# Patient Record
Sex: MALE | Race: WHITE | Employment: FULL TIME | URBAN - METROPOLITAN AREA
[De-identification: names, ages, dates, MRNs, and addresses within clinical notes are randomized per-mention and may not be internally consistent; named-entity substitution may affect disease eponyms.]

---

## 2019-03-06 ENCOUNTER — HOSPITAL ENCOUNTER (EMERGENCY)
Age: 44
Discharge: HOME | End: 2019-03-07

## 2019-03-06 DIAGNOSIS — R04.0 EPISTAXIS: Primary | ICD-10-CM

## 2019-03-06 LAB
ANION GAP SERPL CALCULATED.3IONS-SCNC: 15 MMOL/L (ref 3–16)
BASOPHILS ABSOLUTE: 0 K/UL (ref 0–0.2)
BASOPHILS RELATIVE PERCENT: 0.6 %
BUN BLDV-MCNC: 21 MG/DL (ref 7–20)
CALCIUM SERPL-MCNC: 9.3 MG/DL (ref 8.3–10.6)
CHLORIDE BLD-SCNC: 100 MMOL/L (ref 99–110)
CO2: 21 MMOL/L (ref 21–32)
CREAT SERPL-MCNC: 1 MG/DL (ref 0.9–1.3)
EOSINOPHILS ABSOLUTE: 0 K/UL (ref 0–0.6)
EOSINOPHILS RELATIVE PERCENT: 0.5 %
GFR AFRICAN AMERICAN: >60
GFR NON-AFRICAN AMERICAN: >60
GLUCOSE BLD-MCNC: 109 MG/DL (ref 70–99)
HCT VFR BLD CALC: 37.2 % (ref 40.5–52.5)
HEMOGLOBIN: 12.4 G/DL (ref 13.5–17.5)
LYMPHOCYTES ABSOLUTE: 1.3 K/UL (ref 1–5.1)
LYMPHOCYTES RELATIVE PERCENT: 15.6 %
MCH RBC QN AUTO: 30.6 PG (ref 26–34)
MCHC RBC AUTO-ENTMCNC: 33.4 G/DL (ref 31–36)
MCV RBC AUTO: 91.7 FL (ref 80–100)
MONOCYTES ABSOLUTE: 0.6 K/UL (ref 0–1.3)
MONOCYTES RELATIVE PERCENT: 7.5 %
NEUTROPHILS ABSOLUTE: 6.3 K/UL (ref 1.7–7.7)
NEUTROPHILS RELATIVE PERCENT: 75.8 %
PDW BLD-RTO: 13.8 % (ref 12.4–15.4)
PLATELET # BLD: 228 K/UL (ref 135–450)
PMV BLD AUTO: 7.9 FL (ref 5–10.5)
POTASSIUM SERPL-SCNC: 4 MMOL/L (ref 3.5–5.1)
RBC # BLD: 4.05 M/UL (ref 4.2–5.9)
SODIUM BLD-SCNC: 136 MMOL/L (ref 136–145)
WBC # BLD: 8.3 K/UL (ref 4–11)

## 2019-03-06 PROCEDURE — 80048 BASIC METABOLIC PNL TOTAL CA: CPT

## 2019-03-06 PROCEDURE — 99283 EMERGENCY DEPT VISIT LOW MDM: CPT

## 2019-03-06 PROCEDURE — 85025 COMPLETE CBC W/AUTO DIFF WBC: CPT

## 2019-03-06 ASSESSMENT — PAIN SCALES - GENERAL: PAINLEVEL_OUTOF10: 0

## 2019-03-07 VITALS
DIASTOLIC BLOOD PRESSURE: 79 MMHG | SYSTOLIC BLOOD PRESSURE: 123 MMHG | TEMPERATURE: 98.7 F | BODY MASS INDEX: 32.92 KG/M2 | HEART RATE: 122 BPM | WEIGHT: 264.77 LBS | OXYGEN SATURATION: 99 % | RESPIRATION RATE: 18 BRPM | HEIGHT: 75 IN

## 2019-03-07 PROCEDURE — 6370000000 HC RX 637 (ALT 250 FOR IP): Performed by: PHYSICIAN ASSISTANT

## 2019-03-07 RX ADMIN — COCAINE HYDROCHLORIDE: 40 SOLUTION TOPICAL at 01:05

## 2019-03-16 ASSESSMENT — ENCOUNTER SYMPTOMS
EYE REDNESS: 0
SHORTNESS OF BREATH: 0
NAUSEA: 0
APNEA: 0
BACK PAIN: 0
CHOKING: 0
VOMITING: 0
ABDOMINAL PAIN: 0
FACIAL SWELLING: 0
EYE DISCHARGE: 0

## 2020-09-29 ENCOUNTER — APPOINTMENT (OUTPATIENT)
Dept: GENERAL RADIOLOGY | Age: 45
End: 2020-09-29

## 2020-09-29 ENCOUNTER — HOSPITAL ENCOUNTER (EMERGENCY)
Age: 45
Discharge: HOME | End: 2020-09-29
Attending: STUDENT IN AN ORGANIZED HEALTH CARE EDUCATION/TRAINING PROGRAM

## 2020-09-29 VITALS
RESPIRATION RATE: 17 BRPM | HEART RATE: 78 BPM | BODY MASS INDEX: 32.23 KG/M2 | OXYGEN SATURATION: 99 % | SYSTOLIC BLOOD PRESSURE: 124 MMHG | DIASTOLIC BLOOD PRESSURE: 85 MMHG | HEIGHT: 76 IN | TEMPERATURE: 99.1 F

## 2020-09-29 LAB
ANION GAP SERPL CALCULATED.3IONS-SCNC: 10 MMOL/L (ref 3–16)
BASOPHILS ABSOLUTE: 0 K/UL (ref 0–0.2)
BASOPHILS RELATIVE PERCENT: 0.4 %
BUN BLDV-MCNC: 18 MG/DL (ref 7–20)
CALCIUM SERPL-MCNC: 9.7 MG/DL (ref 8.3–10.6)
CHLORIDE BLD-SCNC: 103 MMOL/L (ref 99–110)
CO2: 25 MMOL/L (ref 21–32)
CREAT SERPL-MCNC: 1.1 MG/DL (ref 0.9–1.3)
EOSINOPHILS ABSOLUTE: 0.1 K/UL (ref 0–0.6)
EOSINOPHILS RELATIVE PERCENT: 1.5 %
GFR AFRICAN AMERICAN: >60
GFR NON-AFRICAN AMERICAN: >60
GLUCOSE BLD-MCNC: 102 MG/DL (ref 70–99)
HCT VFR BLD CALC: 40.3 % (ref 40.5–52.5)
HEMOGLOBIN: 13.6 G/DL (ref 13.5–17.5)
LYMPHOCYTES ABSOLUTE: 2.2 K/UL (ref 1–5.1)
LYMPHOCYTES RELATIVE PERCENT: 28.1 %
MCH RBC QN AUTO: 30.2 PG (ref 26–34)
MCHC RBC AUTO-ENTMCNC: 33.7 G/DL (ref 31–36)
MCV RBC AUTO: 89.6 FL (ref 80–100)
MONOCYTES ABSOLUTE: 0.6 K/UL (ref 0–1.3)
MONOCYTES RELATIVE PERCENT: 7.7 %
NEUTROPHILS ABSOLUTE: 5 K/UL (ref 1.7–7.7)
NEUTROPHILS RELATIVE PERCENT: 62.3 %
PDW BLD-RTO: 13.5 % (ref 12.4–15.4)
PLATELET # BLD: 196 K/UL (ref 135–450)
PMV BLD AUTO: 8.7 FL (ref 5–10.5)
POTASSIUM REFLEX MAGNESIUM: 3.8 MMOL/L (ref 3.5–5.1)
RBC # BLD: 4.5 M/UL (ref 4.2–5.9)
SODIUM BLD-SCNC: 138 MMOL/L (ref 136–145)
TROPONIN: <0.01 NG/ML
TROPONIN: <0.01 NG/ML
WBC # BLD: 8 K/UL (ref 4–11)

## 2020-09-29 PROCEDURE — 71046 X-RAY EXAM CHEST 2 VIEWS: CPT

## 2020-09-29 PROCEDURE — 99285 EMERGENCY DEPT VISIT HI MDM: CPT

## 2020-09-29 PROCEDURE — 80048 BASIC METABOLIC PNL TOTAL CA: CPT

## 2020-09-29 PROCEDURE — 85025 COMPLETE CBC W/AUTO DIFF WBC: CPT

## 2020-09-29 PROCEDURE — 93005 ELECTROCARDIOGRAM TRACING: CPT | Performed by: STUDENT IN AN ORGANIZED HEALTH CARE EDUCATION/TRAINING PROGRAM

## 2020-09-29 PROCEDURE — 84484 ASSAY OF TROPONIN QUANT: CPT

## 2020-09-29 RX ORDER — FAMOTIDINE 20 MG/1
20 TABLET, FILM COATED ORAL 2 TIMES DAILY PRN
Qty: 60 TABLET | Refills: 0 | Status: SHIPPED | OUTPATIENT
Start: 2020-09-29 | End: 2021-09-13 | Stop reason: CLARIF

## 2020-09-29 ASSESSMENT — PAIN DESCRIPTION - LOCATION: LOCATION: CHEST

## 2020-09-29 ASSESSMENT — PAIN DESCRIPTION - ONSET: ONSET: ON-GOING

## 2020-09-29 ASSESSMENT — PAIN DESCRIPTION - DESCRIPTORS: DESCRIPTORS: STABBING

## 2020-09-29 ASSESSMENT — PAIN DESCRIPTION - FREQUENCY: FREQUENCY: INTERMITTENT

## 2020-09-29 ASSESSMENT — PAIN SCALES - GENERAL: PAINLEVEL_OUTOF10: 3

## 2020-09-29 ASSESSMENT — PAIN DESCRIPTION - ORIENTATION: ORIENTATION: LEFT

## 2020-09-29 ASSESSMENT — PAIN DESCRIPTION - PAIN TYPE: TYPE: ACUTE PAIN

## 2020-09-29 ASSESSMENT — PAIN DESCRIPTION - PROGRESSION: CLINICAL_PROGRESSION: NOT CHANGED

## 2020-09-29 NOTE — ED TRIAGE NOTES
Pt arrives to the ER via private vehicle with complaints of chest pain. Pt states it started years ago, but last Thursday it got a lot worse and has been bad since then. Pt states pain is 3/10. Pt states no cardiac hx. NAD noted, respirations even and easy, skin warm and dry.

## 2020-09-30 LAB
EKG ATRIAL RATE: 72 BPM
EKG ATRIAL RATE: 86 BPM
EKG DIAGNOSIS: NORMAL
EKG DIAGNOSIS: NORMAL
EKG P AXIS: 27 DEGREES
EKG P AXIS: 51 DEGREES
EKG P-R INTERVAL: 158 MS
EKG P-R INTERVAL: 170 MS
EKG Q-T INTERVAL: 358 MS
EKG Q-T INTERVAL: 378 MS
EKG QRS DURATION: 102 MS
EKG QRS DURATION: 112 MS
EKG QTC CALCULATION (BAZETT): 413 MS
EKG QTC CALCULATION (BAZETT): 428 MS
EKG R AXIS: 27 DEGREES
EKG R AXIS: 46 DEGREES
EKG T AXIS: 34 DEGREES
EKG T AXIS: 51 DEGREES
EKG VENTRICULAR RATE: 72 BPM
EKG VENTRICULAR RATE: 86 BPM

## 2020-09-30 PROCEDURE — 93010 ELECTROCARDIOGRAM REPORT: CPT | Performed by: INTERNAL MEDICINE

## 2020-09-30 NOTE — ED PROVIDER NOTES
[address] [facility]      Pt Name: Rafael Zavala  MRN: [id number]  [place] 1975  Date of evaluation: 9/29/2020  Provider: Emmanuel Hsieh MD    CHIEF COMPLAINT       Chief Complaint   Patient presents with    Chest Pain     since thursday         HISTORY OF PRESENT ILLNESS   (Location/Symptom, Timing/Onset,Context/Setting, Quality, Duration, Modifying Factors, Severity)  Note limiting factors. Rafael Zavala is a 39 y.o. male who presents to the emergency department c/o chest pain x 4 days. Described as intermittent, waxing and waning in nature, described as burning and tightness localized to the central left chest, non radiating, worse in the morning and when recumbent, lasts 10-30 seconds at the shortest duration and 1-2 hours at the longest, typically alleviated by eating breakfast, associated with \"feeling every heart beat\" not associated with shortness of breath, nausea, vomiting, diaphoresis. Episode 4 days ago not like episode today, stronger, lasted for 1 hour prior to completely resolving. Pt does manual labor for a living and pain never brought on or associated with exertion. Symptoms not otherwise alleviated or exacerbated by other factors. NursingNotes were reviewed. REVIEW OF SYSTEMS    (2-9 systems for level 4, 10 or more for level 5)       Constitutional: No fever or chills. Eye: No visual disturbances. No eye pain. Ear/Nose/Mouth/Throat: No nasal congestion. No sore throat. Respiratory: No cough, No shortness of breath, No sputum production. Cardiovascular: + chest pain. No palpitations. Gastrointestinal: No abdominal pain. No nausea or vomiting  Genitourinary: No dysuria. No hematuria. Hematology/Lymphatics: No bleeding or bruising tendency. Immunologic: No malaise. No swollen glands. Musculoskeletal: No back pain. No joint pain. Integumentary: No rash. No abrasions. Neurologic: No headache.  No focal numbness or weakness. PAST MEDICAL HISTORY   History reviewed. No pertinent past medical history. SURGICALHISTORY     History reviewed. No pertinent surgical history. CURRENT MEDICATIONS       Discharge Medication List as of 9/29/2020  9:11 PM          ALLERGIES     Patient has no known allergies. FAMILY HISTORY     History reviewed. No pertinent family history. SOCIAL HISTORY       Social History     Socioeconomic History    Marital status: Legally      Spouse name: None    Number of children: None    Years of education: None    Highest education level: None   Occupational History    None   Social Needs    Financial resource strain: None    Food insecurity     Worry: None     Inability: None    Transportation needs     Medical: None     Non-medical: None   Tobacco Use    Smoking status: Never Smoker    Smokeless tobacco: Never Used   Substance and Sexual Activity    Alcohol use:  Yes     Alcohol/week: 6.0 standard drinks     Types: 6 Cans of beer per week     Comment: socially    Drug use: Never    Sexual activity: None   Lifestyle    Physical activity     Days per week: None     Minutes per session: None    Stress: None   Relationships    Social connections     Talks on phone: None     Gets together: None     Attends Cheondoism service: None     Active member of club or organization: None     Attends meetings of clubs or organizations: None     Relationship status: None    Intimate partner violence     Fear of current or ex partner: None     Emotionally abused: None     Physically abused: None     Forced sexual activity: None   Other Topics Concern    None   Social History Narrative    None       SCREENINGS             PHYSICAL EXAM    (up to 7 for level 4, 8 or more for level 5)     ED Triage Vitals [09/29/20 1628]   BP Temp Temp Source Pulse Resp SpO2 Height Weight   (!) 154/98 99.1 °F (37.3 °C) Oral 89 20 99 % 6' 4\" (1.93 m) --       General: Alert and oriented appropriately for age, No acute distress. Eye: Normal conjunctiva. Pupils equal and reactive. HENT: Oral mucosa is moist.  Respiratory: Respirations even and non-labored. Lungs CTAB. Cardiovascular: Normal rate, Regular rhythm. Intact pulses throughout peripherally. Gastrointestinal: Soft, Non-tender, Non-distended. Musculoskeletal: No swelling. Integumentary: Warm, Dry. Neurologic: Alert and appropriate for age. No focal deficits. Psychiatric: Cooperative. DIAGNOSTIC RESULTS     EKG: All EKG's are interpreted by the Emergency Department Physician who either signs or Co-signsthis chart in the absence of a cardiologist.    The Ekg from [id number] interpreted by me shows  normal sinus rhythm with a rate of 86 bpm, incomplete RBBB  Axis is   Normal  QTc is  within an acceptable range  Intervals and Durations are unremarkable. ST Segments: normal  No significant change from prior EKG dated 8/11/2015. Repeat EKG at 1915. The Ekg interpreted by me shows  normal sinus rhythm with a rate of 72 bpm, incomplete RBBB. Axis is   Normal  QTc is  normal  Intervals and Durations are unremarkable. ST Segments: no acute change  No significant change from immediate prior EKG.           RADIOLOGY:   Non-plain filmimages such as CT, Ultrasound and MRI are read by the radiologist. Plain radiographic images are visualized and preliminarily interpreted by the emergency physician with the below findings:      Interpretation per the Radiologist below, if available at the time ofthis note:    XR CHEST (2 VW)   Final Result   No active cardiopulmonary disease               ED BEDSIDE ULTRASOUND:   Performed by ED Physician - none    LABS:  Labs Reviewed   CBC WITH AUTO DIFFERENTIAL - Abnormal; Notable for the following components:       Result Value    Hematocrit 40.3 (*)     All other components within normal limits    Narrative:     Performed at:  [facility]  [address] [facility] [place] [address]   Phone [phone]   BASIC METABOLIC PANEL W/ REFLEX TO MG FOR LOW K - Abnormal; Notable for the following components:    Glucose 102 (*)     All other components within normal limits    Narrative:     Performed at:  [facility]  [address] [place] [address]   Phone [phone]   TROPONIN    Narrative:     Performed at:  [facility]  [address] [place] [address]   Phone [phone]   TROPONIN    Narrative:     Performed at:  [facility]  [address] [place] [address]   Phone [phone]       All other labs were within normal range or not returned as of this dictation. EMERGENCY DEPARTMENT COURSE and DIFFERENTIAL DIAGNOSIS/MDM:   Vitals:    Vitals:    20 1628 20 1850 20 1901 20 2140   BP: (!) 154/98 133/84 137/86 124/85   Pulse: 89 72 78 78   Resp:    Temp: 99.1 °F (37.3 °C)      TempSrc: Oral      SpO2: 99% 98% 99% 99%   Height: 6' 4\" (1.93 m)            Medical decision makin yo M with chest pain that he has felt intermittently for years but describes worse over the past 4 days, described as likely GERD given worse after lying recumbent and alleviated by food, not exertional and has not bothered pt with manual labor. Pt concerned about potential cardiac etiology and is reasonable, states without insurance, recommended use [facility]) services to obtain prompt outpt follow up as pt without active chest pain, only moderately suspicious story for anginal type pain and risk factor of obesity, no EKG changes from 2015 EKG and no interval change in the ED, and no troponin detectability x 2.       Heart Score for chest pain patients:  History:  [] Slightly suspicious 0  [x] Moderately suspicious +1  [] Highly suspicious +2    · Middle or left-sided  · Heaviness  · Initiated by exertion, emotion, or cold  · Radiation  · Relief of symptoms with Nitroglycerin      0 = 0 of the risk factors  +1 = 1 or 2 of the risk factors  +2 = 3 or more of risk factors    EKG:  [x] Normal 0  [] Non-specific disturbance +1  [] Significant ST deviation +2  Age:  [x] <45 = 0  [] 45-65 = +1  [] >65 = +2  Risk factors:  DM, Hyperlipidemia, HTN, Obesity, CAD, HIV, Smoking, Cocaine, Family History,   [] No risk factors 0  [x] 1 or 2 risk factors +1  [] 3+ risk factors +2  Troponin:  [x] Normal 0  [] 1-3X normal +1  [] >3X normal +2  HEART SCORE TOTAL = 2  Scores 0-3: 0.9-1.7% risk of adverse cardiac event. Scores 4-6: 12-16.6% risk of adverse cardiac event. Scores ? 7: 50-65% risk of adverse cardiac event. A MACE (Major Adverse Cardiac Event) was defined as all-cause mortality, myocardial infarction, or coronary revascularization. HEART Score ? 3 and 2 negative troponins:    I have discussed with the patient my clinical impression and the result of the HEART Score to screen for MACE, as well as the risks of further testing and hospitalization. The HEART Score shows that the risk for MACE is less than 1%. Although the risk of MACE has not been completely eliminated, the risks of further testing or hospitalization for MACE likely exceed any potential benefit, and the patient agrees with not pursuing further emergent evaluation or hospitalization for MACE at this time. Given d/c instructions and return precautions, pt voices understanding. Instructed pt to return to the ED for inpatient workup if could not obtain outpatient workup in reasonable time frame. D/c home, ambulated steadily from the ED. FINAL IMPRESSION      1.  Acute chest pain          DISPOSITION/PLAN   DISPOSITION Decision To Discharge 09/29/2020 08:48:15 PM      PAT[facility] Pre[facility]  [phone]          DISCHARGE MEDICATIONS:  Discharge Medication List as of 9/29/2020  9:11 PM      START taking these medications    Details famotidine (PEPCID) 20 MG tablet Take 1 tablet by mouth 2 times daily as needed (reflux), Disp-60 tablet,R-0Print                (Please note that portions of this note were completed with a voice recognition program.Efforts were made to edit the dictations but occasionally words are mis-transcribed.)    Rayne Church MD (electronically signed)  Attending Emergency Physician          Rayne Church MD  09/30/20 8081

## 2021-08-12 ENCOUNTER — OFFICE VISIT (OUTPATIENT)
Dept: SURGERY | Age: 46
End: 2021-08-12
Payer: COMMERCIAL

## 2021-08-12 VITALS — WEIGHT: 256 LBS | DIASTOLIC BLOOD PRESSURE: 88 MMHG | BODY MASS INDEX: 31.16 KG/M2 | SYSTOLIC BLOOD PRESSURE: 138 MMHG

## 2021-08-12 DIAGNOSIS — K64.8 INTERNAL HEMORRHOID, BLEEDING: Primary | ICD-10-CM

## 2021-08-12 PROCEDURE — 99202 OFFICE O/P NEW SF 15 MIN: CPT | Performed by: SURGERY

## 2021-08-12 ASSESSMENT — ENCOUNTER SYMPTOMS: ANAL BLEEDING: 1

## 2021-08-12 NOTE — PATIENT INSTRUCTIONS
CONSERVATIVE HEMORRHOID MANAGEMENT      Hemorrhoids can result from issues with chronic constipation or diarrhea, straining during bowel movements, sitting for long periods of time on the toilet, as well as obesity and pregnancy. Promoting bowel regularity and ease of passage of a bowel movement will decrease issues with hemorrhoids. One of the best ways to promote bowel regularity is by consuming a higher fiber diet. Current recommendations are that adults should try and consume 30 grams of fiber daily. Dietary fibers are either soluble or insoluble and work in slightly different ways, though are both equally important. Do not try and consume 30 grams of fiber all at once. Slowly increase your intake over a month. As you increase your fiber intake also increase the amount of water you drink. You may find it difficult to eat 30 grams of fiber daily. Fiber supplements such as Metamucil, Konsyl, or Citrucel are good sources of fiber that can be easily consumed when mixed with water. Soluble fiber is soluble in water and works by attracting water (like a sponge) to form a gel, which will slow down digestion and the movement of food through the gastrointestinal tract. Slowing digestion will make you feel full. Fiber supplements such as Metamucil, Konsyl, and Citrucel are good sources of soluble fiber. Some of the benefits of soluble fiber include weight control, lowering LDL (bad) cholesterol, improving blood sugar control, improving diarrhea, and promoting bowel regularity. Soluble fibers include psyllium, oranges, apples, pears, blueberries, strawberries, oatmeal, oat bran, oat cereal, lentils, beans, nuts, flaxseed, cucumbers, celery, and carrots. Insoluble fibers do not dissolve in water and work by adding bulk. They pass through the gastrointestinal tract relatively undigested and promote a laxative type effect, which decreases constipation.   Most of the vegetables and whole grains we eat contain insoluble fiber. The major benefit of insoluble fiber is decreasing constipation. Insoluble fibers include whole grains, whole wheat, wheat bran, bran, brown rice, nuts, barley, seeds, couscous, leafy vegetables, cabbage, broccoli, celery, carrots, green beans, cucumbers, grapes, and raisins. When starting to use a fiber supplement, such as Metamucil for example, do so slowly over a month. Start with 1 tablespoon a day for a week, increase to 2 tablespoons a day on week 2. If it is inconvenient to take a fiber supplement throughout the day is it acceptable to take it all before bed.     Please call [phone] with any questions/concerns

## 2021-08-12 NOTE — PROGRESS NOTES
Subjective:      Patient ID: Rut Guidry is a 42 y.o. male. HPI  Chief Complaint: bleeding  Patient referred by self for evaluation of rectal bleeding. Patient reports symptoms of blood that can fill the toilet. Denies pain, itching. Symptoms were first noted one week ago. Alleviated by anusol suppositories over last 3 days. Symptoms aggravated by recent long road trip. Was constipated and bowel habits worsened at that time. usually pretty regular. Denies CP, dyspnea, dizziness. Previous evaluation includes none. History hemorrhoids in past but nothing this severe. Dad with colon cancer in his 62s, maternal grandfather similarly. Patient has no significant medical or surgical history. Will plan following treatment: fiber supplementation, referral to GI for banding if no improvement. No past medical history on file. No past surgical history on file. Current Outpatient Medications   Medication Sig Dispense Refill    famotidine (PEPCID) 20 MG tablet Take 1 tablet by mouth 2 times daily as needed (reflux) (Patient not taking: Reported on 8/12/2021) 60 tablet 0     No current facility-administered medications for this visit. Prior to Admission medications    Medication Sig Start Date End Date Taking? Authorizing Provider   famotidine (PEPCID) 20 MG tablet Take 1 tablet by mouth 2 times daily as needed (reflux)  Patient not taking: Reported on 8/12/2021 9/29/20   Sirena Luna MD         No Known Allergies    Social History     Socioeconomic History    Marital status: Legally      Spouse name: Not on file    Number of children: Not on file    Years of education: Not on file    Highest education level: Not on file   Occupational History    Not on file   Tobacco Use    Smoking status: Never Smoker    Smokeless tobacco: Never Used   Substance and Sexual Activity    Alcohol use:  Yes     Alcohol/week: 6.0 standard drinks     Types: 6 Cans of beer per week     Comment: socially    Drug use: Never    Sexual activity: Not on file   Other Topics Concern    Not on file   Social History Narrative    Not on file     Social Determinants of Health     Financial Resource Strain:     Difficulty of Paying Living Expenses:    Food Insecurity:     Worried About Running Out of Food in the Last Year:     [address] in the Last Year:    Transportation Needs:     Lack of Transportation (Medical):  Lack of Transportation (Non-Medical):    Physical Activity:     Days of Exercise per Week:     Minutes of Exercise per Session:    Stress:     Feeling of Stress :    Social Connections:     Frequency of Communication with Friends and Family:     Frequency of Social Gatherings with Friends and Family:     Attends Hoahaoism Services:     Active Member of Clubs or Organizations:     Attends Club or Organization Meetings:     Marital Status:    Intimate Partner Violence:     Fear of Current or Ex-Partner:     Emotionally Abused:     Physically Abused:     Sexually Abused:        No family history on file. Review of Systems   Constitutional: Negative. Gastrointestinal: Positive for anal bleeding. Hematological: Negative. All other systems reviewed and are negative. Objective:   Physical Exam  Vitals reviewed. Constitutional:       General: He is not in acute distress. Appearance: He is well-developed. He is not diaphoretic. HENT:      Head: Normocephalic and atraumatic. Right Ear: External ear normal.      Left Ear: External ear normal.   Eyes:      Conjunctiva/sclera: Conjunctivae normal.      Pupils: Pupils are equal, round, and reactive to light. Neck:      Trachea: Trachea normal.   Cardiovascular:      Heart sounds: S1 normal and S2 normal.   Pulmonary:      Effort: Pulmonary effort is normal. No respiratory distress. Abdominal:      General: There is no distension. Palpations: Abdomen is soft.    Genitourinary:     Comments: Small friable internal hemorrhoids left lateral column  Musculoskeletal:         General: Normal range of motion. Cervical back: Normal range of motion and neck supple. Skin:     General: Skin is warm and dry. Findings: No erythema. Neurological:      Mental Status: He is alert and oriented to person, place, and time. Psychiatric:         Behavior: Behavior normal. Behavior is cooperative. Thought Content: Thought content normal.         Judgment: Judgment normal.         Assessment:       Diagnosis Orders   1. Internal hemorrhoid, bleeding  [facility] - Navin Henry DO, Gastroenterology, [facility]           Plan:      Recommend fiber supplements daily to BID to promote bowel regularity.   Increase daily water intake as well  Instructions provided  If no improvement over next 7-10 days then plan referral to GI for banding        Angelica Cee MD

## 2021-10-12 ENCOUNTER — OFFICE VISIT (OUTPATIENT)
Dept: ORTHOPEDIC SURGERY | Age: 46
End: 2021-10-12
Payer: COMMERCIAL

## 2021-10-12 VITALS — HEIGHT: 76 IN | BODY MASS INDEX: 31.05 KG/M2 | WEIGHT: 255 LBS

## 2021-10-12 DIAGNOSIS — M67.911 ROTATOR CUFF DYSFUNCTION, RIGHT: ICD-10-CM

## 2021-10-12 DIAGNOSIS — S46.911A STRAIN OF RIGHT SHOULDER, INITIAL ENCOUNTER: Primary | ICD-10-CM

## 2021-10-12 PROCEDURE — 99203 OFFICE O/P NEW LOW 30 MIN: CPT | Performed by: ORTHOPAEDIC SURGERY

## 2021-10-12 RX ORDER — MELOXICAM 15 MG/1
15 TABLET ORAL DAILY
Qty: 30 TABLET | Refills: 3 | Status: SHIPPED | OUTPATIENT
Start: 2021-10-12 | End: 2022-01-07 | Stop reason: CLARIF

## 2021-10-12 RX ORDER — TRAMADOL HYDROCHLORIDE 50 MG/1
50 TABLET ORAL NIGHTLY PRN
Qty: 10 TABLET | Refills: 0 | Status: SHIPPED | OUTPATIENT
Start: 2021-10-12 | End: 2021-10-19

## 2021-10-12 NOTE — PROGRESS NOTES
12 [facility]  History and Physical  Shoulder Pain    Date:  10/12/2021    Name:  Geri Larry  Address:  [address] [place] [address] [place] [address]    :  1975      Age:   42 y.o.    SSN:  [id number]      Medical Record Number:  <[id number]>    Reason for Visit:    Shoulder Pain (NP WC RIGHT SHOULDER)      HPI:   Geri Larry is a 42 y.o. male who presents to our office today complaining of  right shoulder pain. This patient unfortunately had a work-related injury. 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 He reports that these are somewhat heavy trophies made out a marble. He reports on 2020 he was lifting several trophies and felt a sharp pain in his right shoulder. Since then he has continued to have persistent pain and has noted weakness with overhead lifting. Patient reports that prior to this injury he never had problems with his right shoulder. He has never had to seek medical care for the right shoulder also. He reports that it is starting to interfere with his sleep at nighttime. He reports that he has not had any treatment for this so far. He was trying to manage it on his own but his symptoms continued to stay persistent. 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 Pain Assessment  Location Modifiers: Right  Severity of Pain: 9  Quality of Pain: Aching, Dull, Sharp, Throbbing  Duration of Pain: Persistent  Frequency of Pain: Intermittent  Aggravating Factors: Other (Comment) (SLEEPING)  Limiting Behavior: Yes  Relieving Factors: Rest  Result of Injury: Yes  Work-Related Injury: Yes  Are there other pain locations you wish to document?: No    Review of Systems:  A 14 point review of systems available in the scanned medical record as documented by the patient.   The review is negative with the exception of those things mentioned in the History of Present Illness and Past Medical History. Past History:  History reviewed. No pertinent past medical history. History reviewed. No pertinent surgical history. No current outpatient medications on file prior to visit. No current facility-administered medications on file prior to visit. Social History     Socioeconomic History    Marital status: Legally      Spouse name: Not on file    Number of children: Not on file    Years of education: Not on file    Highest education level: Not on file   Occupational History    Not on file   Tobacco Use    Smoking status: Never Smoker    Smokeless tobacco: Never Used   Substance and Sexual Activity    Alcohol use: Yes     Alcohol/week: 6.0 standard drinks     Types: 6 Cans of beer per week     Comment: socially    Drug use: Never    Sexual activity: Not on file   Other Topics Concern    Not on file   Social History Narrative    Not on file     Social Determinants of Health     Financial Resource Strain: Low Risk     Difficulty of Paying Living Expenses: Not hard at all   Food Insecurity: No Food Insecurity    Worried About [address] [employer] in the Last Year: Never true    [address] [employer] in the Last Year: Never true   Transportation Needs:     Lack of Transportation (Medical):  Lack of Transportation (Non-Medical):    Physical Activity:     Days of Exercise per Week:     Minutes of Exercise per Session:    Stress:     Feeling of Stress :    Social Connections:     Frequency of Communication with Friends and Family:     Frequency of Social Gatherings with Friends and Family:     Attends Shinto Services:     Active Member of Clubs or Organizations:     Attends Club or Organization Meetings:     Marital Status:    Intimate Partner Violence:     Fear of Current or Ex-Partner:     Emotionally Abused:     Physically Abused:     Sexually Abused:      History reviewed.  No pertinent family history. Current Medications:    No current outpatient medications on file. No current facility-administered medications for this visit. Allergies:  No Known Allergies    Physical Exam:  There were no vitals filed for this visit. General: Flo Raya is a healthy and well appearing 42 y.o. male who is sitting comfortably in our office in acute distress. General Exam:   Constitutional: Patient is adequately groomed with no evidence of malnutrition  DTRs: Deep tendon reflexes are intact  Mental Status: The patient is oriented to time, place and person. The patient's mood and affect are appropriate. Lymphatic: The lymphatic examination bilaterally reveals all areas to be without enlargement or induration. Vascular: Examination reveals no swelling or calf tenderness. Peripheral pulses are palpable and 2+. Neurological: The patient has good coordination. There is no weakness or sensory deficit. Neuro: alert. Oriented X 3  Eyes: Extra-ocular muscles intact  Mouth: Oral mucosa moist. No perioral lesions  Pulm: Respirations unlabored and regular. right Shoulder Exam:  Inspection:  No gross deformities, no signs of infection. Palpation:  He has tenderness over the rotator cuff footprint. No tenderness over the bicipital groove and ac joint. Active Range of Motion: Forward elevation of 150 with pain past 90 and has a painful arc, abduction of 140, external rotation with elbow at the side 45, internal rotation to the back is T7 vs T6. He has pain on arm descent. Passive Range of Motion: Passively forward elevation can be further increased to 160. Strength:  -5/5 jobes, -5/5 external rotation with resistance, 5/5 internal rotation with resistance    Special Tests:  Positive speeds, positive Neers, +cross body adduction,  No Daljit muscle deformity.     Neurovascular: Sensation to light touch is intact, no motor deficits, palpable radial pulses 2+  Comparison left Shoulder Examination:    Inspection:   No gross deformities, no signs of infection. Palpation:  He has no tenderness over the rotator footprint. No tenderness over the bicipital groove and ac joint. Active Range of Motion: Forward elevation of 160, abduction of 160, external rotation with elbow at the side 45, internal rotation to the back is T6    Passive Range of Motion:  Similar to active  Strength:  5/5 jobes, 5/5 external rotation with resistance, 5/5 internal rotation with resistance    Special Tests:   No Daljit muscle deformity. Neurovascular: Sensation to light touch is intact, no motor deficits, palpable radial pulses 2+    Laboratory:  No visits with results within 14 Day(s) from this visit. Latest known visit with results is:   Orders Only on 09/13/2021   Component Date Value    Hemoglobin A1C 09/13/2021 5.2     eAG 09/13/2021 102.5     Sodium 09/13/2021 143     Potassium 09/13/2021 4.6     Chloride 09/13/2021 104     CO2 09/13/2021 24     Anion Gap 09/13/2021 15     Glucose 09/13/2021 89     BUN 09/13/2021 17     CREATININE 09/13/2021 1.0     GFR Non- 09/13/2021 >60     GFR  09/13/2021 >60     Calcium 09/13/2021 10.3     Cholesterol, Total 09/13/2021 171     Triglycerides 09/13/2021 122     HDL 09/13/2021 56     LDL Calculated 09/13/2021 91     VLDL Cholesterol Calcula* 09/13/2021 24       No results found for this or any previous visit (from the past 24 hour(s)). Radiographic:  3 xray views of the right  shoulder including True AP in internal and external and axillary lateral were taken in our office today reveal no fractures, dislocations, visible tumors, or signs of acute trauma. Self assessment questionnaires including ASES and Simple Shoulder Test were completed today.     Assessment:  Zack Coronel is a 42 y.o. male with a history of a work-related injury currently with right shoulder pain related to rotator cuff dysfunction    Impression:  Encounter Diagnoses   Name Primary?  Strain of right shoulder, initial encounter Yes    Rotator cuff dysfunction, right        Office Procedures:  Orders Placed This Encounter   Procedures    XR SHOULDER RIGHT (MIN 2 VIEWS)     Standing Status:   Future     Number of Occurrences:   1     Standing Expiration Date:   10/12/2022     Order Specific Question:   Reason for exam:     Answer:   pain    MRI SHOULDER RIGHT WO CONTRAST     Standing Status:   Future     Standing Expiration Date:   10/12/2022     Order Specific Question:   Reason for exam:     Answer:   MRI right shoulder PRoscan       Plan:   Patient fortunately had a work-related injury currently having pain and weakness with overhead lifting specifically. His exam does show rotator cuff weakness and biceps pathology. This has been ongoing for nearly 11 months. 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 We will need to get this approved through Workmen's Comp. first.  Once this is completed we will see him back in our office to review the results. In the meantime we will go ahead and prescribe meloxicam which was sent to his pharmacy. We also sent a small prescription for tramadol that he can take at nighttime for any discomfort. 10/12/2021  9:38 AM      Lex Doherty PA-C  Orthopaedic Sports Medicine Physician Assistant    During this examination, I, Lex Doherty PA-C, functioned as a scribe for Dr. Calista Perez. This dictation was performed with a verbal recognition program (DRAGON) and it was checked for errors. It is possible that there are still dictated errors within this office note. If so, please bring any errors to my attention for an addendum.   All efforts were made to ensure that this office note is accurate.  _________________  I, Dr. Calista Perez, personally performed the services described in this documentation as described by Deng Camilo PA-C in my presence, and it is both accurate and complete. Ike Matos MD, PhD  10/12/2021

## 2021-11-04 ENCOUNTER — TELEPHONE (OUTPATIENT)
Dept: ORTHOPEDIC SURGERY | Age: 46
End: 2021-11-04

## 2021-11-04 NOTE — TELEPHONE ENCOUNTER
Received approval for MRI    Would like to get scheduled        Please call [phone] la frederick

## 2021-11-15 ENCOUNTER — TELEPHONE (OUTPATIENT)
Dept: ORTHOPEDIC SURGERY | Age: 46
End: 2021-11-15

## 2021-11-30 ENCOUNTER — OFFICE VISIT (OUTPATIENT)
Dept: ORTHOPEDIC SURGERY | Age: 46
End: 2021-11-30
Payer: COMMERCIAL

## 2021-11-30 VITALS — HEIGHT: 76 IN | WEIGHT: 255 LBS | BODY MASS INDEX: 31.05 KG/M2

## 2021-11-30 DIAGNOSIS — M67.911 ROTATOR CUFF DYSFUNCTION, RIGHT: ICD-10-CM

## 2021-11-30 DIAGNOSIS — S46.911A STRAIN OF RIGHT SHOULDER, INITIAL ENCOUNTER: Primary | ICD-10-CM

## 2021-11-30 PROCEDURE — 99213 OFFICE O/P EST LOW 20 MIN: CPT | Performed by: ORTHOPAEDIC SURGERY

## 2021-11-30 NOTE — LETTER
[facility]  Surgery Precert & Billing Form:    DEMOGRAPHICS:                                                                                                       Patient Name:  Kashif Fisher  Patient :  1975   Patient SS#:      Patient Phone:  [phone] (home) [phone] (work) Alt. Patient Phone:    Patient Address:  [address] [place] [address]    PCP:  Myles Wesley MD  Insurance: work comp     DIAGNOSIS & PROCEDURE:                                                                                      Diagnosis:     Strain of right shoulder, initial encounter  - Primary [id number]    Rotator cuff dysfunction, right  M67.911        Operation: right    SURGERY  INFORMATION  Date of Surgery:      Location:       Type:    Outpatient  23 hour hold:  No  Surgeon:        Makenna Worley.  Christy Reardon MD         21     BILLING INFORMATION:                                                                                                Physician Procedure                                            CPT Codes                      PA, or Fellow Procedure                                      CPT Codes                      Precert information:

## 2021-11-30 NOTE — LETTER
[facility]  Surgery Precert & Billing Form:    DEMOGRAPHICS:                                                                                                       Patient Name:  Antoinette Bertrand  Patient :  1975   Patient SS#:      Patient Phone:  [phone] (home) [phone] (work) Alt. Patient Phone:    Patient Address:  [address] [place] [address]    PCP:  Yuridia Nguyễn MD  Insurance: work comp     DIAGNOSIS & PROCEDURE:                                                                                      Diagnosis:     Strain of right shoulder, initial encounter  - Primary [id number]    Rotator cuff dysfunction, right  M67.911        Operation: right    SURGERY  INFORMATION  Date of Surgery:      Location:       Type:    Outpatient  23 hour hold:  No  Surgeon:        Nicole De Souza.  Radha Waggoner MD         21     BILLING INFORMATION:                                                                                                Physician Procedure                                            CPT Codes                      PA, or Fellow Procedure                                      CPT Codes                      Precert information:

## 2021-11-30 NOTE — PROGRESS NOTES
Chief Complaint    Shoulder Pain ( RIGHT SHOULDER MRI)      History of Present Illness:  Grady Lombardo is a pleasant, 42 y.o., male, here today for follow up of his right shoulder and imaging results. To recap, this patient sustained a work-related injury on 11/18/20 which resulted in persistent pain and weakness with daily activities, especially overhead activities. This is being covered under [facility]. We did order an Mri to evaluate the rotator cuff and biceps. He tolerated the study well and presents today to review those results. He has had no real change in symptoms since previously evaluated. He reports no new injuries or setbacks. Pain Assessment  Location of Pain: Shoulder  Location Modifiers: Right  Severity of Pain: 0  Quality of Pain: Aching, Sharp  Frequency of Pain: Intermittent  Aggravating Factors: Other (Comment) (sleeping)  Limiting Behavior: Some  Relieving Factors: Rest, Nsaids  Result of Injury: Yes  Work-Related Injury: Yes  Are there other pain locations you wish to document?: No      Medical History:  Patient's medications, allergies, past medical, surgical, social and family histories were reviewed and updated as appropriate. Patient has had no medical changes since last evaluated 10/12/21          Review of Systems  A 14 point review of systems was completed by the patient on 10/12/21 and is available in the media section of the scanned medical record and was reviewed on 11/30/2021. The review is negative with the exception of those things mentioned in the HPI and Past Medical History    Vital Signs: There were no vitals filed for this visit. General/Appearance: Alert and oriented and in no apparent distress. Skin:  There are no skin lesions, cellulitis, or extreme edema. The patient has warm and well-perfused Bilateral upper extremities with brisk capillary refill. Right Shoulder Exam:  Inspection: No gross deformities, no signs of infection.     Palpation: Point of maximal tenderness is over the biceps    Active Range of Motion: Forward Elevation 150, External Rotation 45    Passive Range of Motion: Deferred    Strength:  External Rotation 5-/5, Internal Rotation 5-/5, Supraspinatus 4+/5    Special Tests:  Positive Speed's, Positive Jorge's, No Daljit muscle deformity. Neurovascular: Sensation to light touch is intact, no motor deficits, palpable radial pulses 2+      Radiology:     No new XR obtained at this time. MRI Right shoulder: 11/16/21  CONCLUSION:   1. Abnormal signal mid to distal supraspinatus and infraspinatus.  Partial-thickness    interstitial and likely articular surface tearing mid to distal supraspinatus and infraspinatus    measures at least 2.6 x 1.8cm.  This occupies at least 50% of the depth.  Bursitis present. 2. Outlet-related cuff impingement [facility] joint hypertrophy and a lateral downsloping    hypertrophied type 2 acromion.  AC arthrosis present. 3. Nondisplaced SLAP 2C tear.  This is chronic and may be subclinical.         Assessment :  Mr. Joshua Ryan is a pleasant, 42 y.o. patient with a high grade, partial-thickness tear of the rotator cuff. This is a surgical problem. Impression:  Encounter Diagnoses   Name Primary?  Strain of right shoulder, initial encounter Yes    Rotator cuff dysfunction, right        Office Procedures:  No orders of the defined types were placed in this encounter. Treatment Plan: We were able to review pertinent imaging today with the patient. We discussed diagnosis and treatment options in detail. Our recommendation in this young, active patient is surgical intervention to arthroscopically repair the rotator cuff. We will also evaluate the biceps tendon as he may require a biceps tenodesis - this will be decided intra-operatively. We discussed in detail risks, benefits and expectations postoperatively. We also discussed a recovery timeline following this operation.  We could proceed with conservative treatment to manage his pain, however this would only mask his pain rather than address the root problem. If he puts off surgery for a prolonged period of time he risks the tear progressing to full thickness. This will make his recovery less predictable. We will need to get this approved through workmen's comp. We will expedite this process as this patient is ready to move forward with surgery as soon as possible, given he just entered his off season for work and would like to maximize time of recovery. We will see Norma Ferminole back as needed. All questions were answered to patient's satisfaction and He was encouraged to call with any further questions or concerns. Areli [place] is in agreement with this plan. 11/30/2021  3:27 PM    Maria C Gilbert ATC  [employer] [address] [facility]    During this examination, I, Mat Bella, functioned as a scribe for Dr. Kizzy Pierce. The history taking and physical examination were performed by Dr. Sandra Terrell. All counseling during the appointment was performed between the patient and Dr. Sandra Terrell. 11/30/21  ______________  I, Dr. Kizzy Pierce, personally performed the services described in this documentation as described by Maria C Gilbert ATC in my presence, and it is both accurate and complete. Ike Terrell MD, PhD  11/30/2021

## 2021-12-01 ENCOUNTER — TELEPHONE (OUTPATIENT)
Dept: ORTHOPEDIC SURGERY | Age: 46
End: 2021-12-01

## 2021-12-01 NOTE — TELEPHONE ENCOUNTER
WC  called to say this patient stated surgery is scheduled for this Friday. There are no DX allowed on the claim, so if SX gets approved it will only be as Approved With Disclaimer. Also, Sx cannot be requested with DX Rotator cuff dysfunction we will need to request as a RCT.

## 2021-12-16 ENCOUNTER — TELEPHONE (OUTPATIENT)
Dept: ORTHOPEDIC SURGERY | Age: 46
End: 2021-12-16

## 2021-12-16 NOTE — TELEPHONE ENCOUNTER
SX is approved with disclaimer with DX in Allowed/Appeal status. The O  is asking if Ochoa Peres will do surgery with Disclaimer or would he prefer to wait for final  DX decision?

## 2021-12-20 ENCOUNTER — TELEPHONE (OUTPATIENT)
Dept: ORTHOPEDIC SURGERY | Age: 46
End: 2021-12-20

## 2021-12-21 ENCOUNTER — TELEPHONE (OUTPATIENT)
Dept: ORTHOPEDIC SURGERY | Age: 46
End: 2021-12-21

## 2022-01-18 NOTE — PROGRESS NOTES
1/18/22 @ 9498 Pt verbalizes understanding of PAT instructions. Pt will get PCR test and bring results DOS. I also gave pt e mail and fax numbers to send results.   Angie Manzanares

## 2022-01-18 NOTE — PROGRESS NOTES
[address] [facility] patients having surgery or anesthesia are required to be Covid tested OR to have been vaccinated at least 14 days prior to your procedure. It is very important to return our call to [phone] and notify the staff of your last vaccination date otherwise you will be required to complete Covid PCR test within the 5-6 days prior to surgery & quarantine. The results will need to be faxed to PreAdmission Testing at [phone]. PRIOR TO PROCEDURE DATE:        1. PLEASE FOLLOW ANY  GUIDELINES/ INSTRUCTIONS PRIOR TO YOUR PROCEDURE AS ADVISED BY YOUR SURGEON. 2. Arrange for someone to drive you home and be with you for the first 24 hours after discharge for your safety after your procedure for which you received sedation. Ensure it is someone we can share information with regarding your discharge. 3. You must contact your surgeon for instructions IF:   You are taking any blood thinners, aspirin, anti-inflammatory or vitamin E.   There is a change in your physical condition such as a cold, fever, rash, cuts, sores or any other infection, especially near your surgical site. 4. Do not drink alcohol the day before or day of your procedure. 5. A Pre-op History and Physical for surgery MUST be completed by your Physician or Urgent Care within 30 days of your procedure date. Please bring a copy with you on the day of your procedure and along with any other testing performed. THE DAY OF YOUR PROCEDURE:  1. Follow instructions for ARRIVAL TIME as DIRECTED BY YOUR SURGEON. 2. Enter the MAIN entrance from [place] and follow the signs to the free [employer] or [employer]: turn off the lights parking (offered free of charge 6am-5pm). 3. Enter the Main Entrance of the hospital (do not enter from the lower level of the parking garage). Upon entrance, check in with the  at the main desk on your left. If no one is available at the desk, proceed into the [facility] Waiting Room and go through the door directly into the [facility]. There is a Check-in desk ACROSS from Room 5 (marked with a sign hanging from the ceiling). The phone number for the surgery center is [phone]. 4. Please call [phone] option #2 option #2 if you have not been preregistered yet. On the day of your procedure bring your insurance card and photo ID. You will be registered at your bedside once brought back to your room. 5. DO NOT EAT ANYTHING eight hours prior to your arrival for surgery. May have 8 ounces of water 4 hours prior to your arrival for surgery. NOTE: ALL Gastric, Bariatric and Bowel surgery patients MUST follow their surgeon's instructions. 6. MEDICATIONS    Take the following medications with a SMALL sip of water: none   Bariatric patient's call surgeon if on diabetic medications as some need to be stopped 1 week preop   Use your usual dose of inhalers the morning of surgery. BRING your rescue inhaler with you to hospital.    Anesthesia does NOT want you to take insulin the morning of surgery. They will control your blood sugar while you are at the hospital. Please contact your ordering physician for instructions regarding your insulin the night before your procedure. If you have an insulin pump, please keep it set on basal rate. 7. Do not swallow water when brushing teeth. No gum, candy, mints or ice chips. Refrain from smoking or at least decrease the amount. 8. Dress in loose, comfortable clothing appropriate for redressing after your procedure. Do not wear jewelry (including body piercings), make-up (especially NO eye make-up), fingernail polish (NO toenail polish if foot/leg surgery), lotion, powders or metal hairclips. 9. Dentures, glasses, or contacts will need to be removed before your procedure.  Bring cases for your glasses, contacts, dentures, or hearing aids to protect them while you are in surgery. 10. If you use a CPAP, please bring it with you on the day of your procedure. 11. We recommend that valuable personal  belongings such as cash, cell phones, e-tablets or jewelry, be left at home during your stay. The hospital will not be responsible for valuables that are not secured in the hospital safe. However, if your insurance requires a co-pay, you may want to bring a method of payment, i.e. Check or credit card, if you wish to pay your co-pay the day of surgery. 12. If you are to stay overnight, you may bring a bag with personal items. Please have any large items you may need brought in by your family after your arrival to your hospital room. 15. If you have a Living Will or Durable Power of , please bring a copy on the day of your procedure. 15. With your permission, one family member may accompany you while you are being prepared for surgery. Once you are ready, additional family members may join you. HOW WE KEEP YOU SAFE and WORK TO PREVENT SURGICAL SITE INFECTIONS:  1. Health care workers should always check your ID bracelet to verify your name and birth date. You will be asked many times to state your name, date of birth, and allergies. 2. Health care workers should always clean their hands with soap or alcohol gel before providing care to you. It is okay to ask anyone if they cleaned their hands before they touch you. 3. You will be actively involved in verifying the type of procedure you are having and ensuring the correct surgical site. This will be confirmed multiple times prior to your procedure. Do NOT christian your surgery site UNLESS instructed to by your surgeon. 4. Do not shave or wax for 72 hours prior to procedure near your operative site. Shaving with a razor can irritate your skin and make it easier to develop an infection.  On the day of your procedure, any hair that needs to be removed near the surgical site will be clipped INFORMATION REVIEWED PER PHONE WITH YOU AND/OR YOUR FAMILY:  Yes Hibiclens® Bathing Instructions   Yes Antibacterial Soap

## 2022-01-19 ENCOUNTER — OFFICE VISIT (OUTPATIENT)
Dept: ORTHOPEDIC SURGERY | Age: 47
End: 2022-01-19
Payer: COMMERCIAL

## 2022-01-19 VITALS — HEIGHT: 75 IN | WEIGHT: 262 LBS | BODY MASS INDEX: 32.58 KG/M2

## 2022-01-19 DIAGNOSIS — M67.911 ROTATOR CUFF DYSFUNCTION, RIGHT: ICD-10-CM

## 2022-01-19 DIAGNOSIS — S46.911A STRAIN OF RIGHT SHOULDER, INITIAL ENCOUNTER: Primary | ICD-10-CM

## 2022-01-19 PROCEDURE — 99214 OFFICE O/P EST MOD 30 MIN: CPT | Performed by: ORTHOPAEDIC SURGERY

## 2022-01-19 PROCEDURE — L3670 SO ACRO/CLAV CAN WEB PRE OTS: HCPCS | Performed by: ORTHOPAEDIC SURGERY

## 2022-01-19 NOTE — PROGRESS NOTES
Chief Complaint    Pre-op Exam (R shoulder)      History of Present Illness:  Cara Sanford is a pleasant, 42 y.o., male, here today for his preoperative appointment. To recap, this patient sustained a work-related injury on 11/18/20 which resulted in persistent pain and weakness with daily activities, especially overhead activities. This is being covered under [facility]. The MRI confirmed the diagnosis of rotator cuff tear and showed a partial thickness of at least 50% of the depth of the distal supraspinatus and infraspinatus insertion with a SLAP 2C tear. He has had no real change in symptoms since his previously evaluation. He reports no new injuries or setbacks. Pain Assessment  Location of Pain: Shoulder  Location Modifiers: Right  Severity of Pain: 0  Limiting Behavior: Some  Relieving Factors: Rest  Result of Injury: No  Work-Related Injury: No  Are there other pain locations you wish to document?: No      Medical History:  Patient's medications, allergies, past medical, surgical, social and family histories were reviewed and updated as appropriate. ROS done 10/12/21  Patient has had no medical changes since last evaluated        Review of Systems  A 14 point review of systems was completed by the patient on 10/12/21 and is available in the media section of the scanned medical record and was reviewed on 1/19/2022. The review is negative with the exception of those things mentioned in the HPI and Past Medical History    Vital Signs: There were no vitals filed for this visit. General/Appearance: Alert and oriented and in no apparent distress. Skin:  There are no skin lesions, cellulitis, or extreme edema. The patient has warm and well-perfused Bilateral upper extremities with brisk capillary refill. Right Shoulder Exam:  Inspection: No gross deformities, no signs of infection. Palpation: Point of maximal tenderness is over the biceps    Active Range of Motion:   Forward Elevation 150, External Rotation 45    Passive Range of Motion: Deferred    Strength:  External Rotation 5-/5, Internal Rotation 5-/5, Supraspinatus 4+/5    Special Tests:  Positive Speed's, Positive Jorge's, No Daljit muscle deformity. Neurovascular: Sensation to light touch is intact, no motor deficits, palpable radial pulses 2+      Radiology:     No new XR obtained at this time. MRI Right shoulder: 11/16/21  CONCLUSION:   1. Abnormal signal mid to distal supraspinatus and infraspinatus.  Partial-thickness    interstitial and likely articular surface tearing mid to distal supraspinatus and infraspinatus    measures at least 2.6 x 1.8cm.  This occupies at least 50% of the depth.  Bursitis present. 2. Outlet-related cuff impingement secondary to [facility] joint hypertrophy and a lateral downsloping    hypertrophied type 2 acromion.  AC arthrosis present. 3. Nondisplaced SLAP 2C tear.  This is chronic and may be subclinical.         Assessment :  Mr. Maude Joseph is a pleasant, 42 y.o. patient with a high grade, partial-thickness tear of the rotator cuff, he is tender over the biceps groove with a positive Speed test. If he desires getting rid of his pain decisively then this is a surgical problem. Impression:  Encounter Diagnoses   Name Primary?  Strain of right shoulder, initial encounter Yes    Rotator cuff dysfunction, right        Office Procedures:  Orders Placed This Encounter   Procedures    DJO ultrasling IV Shoulder Sling     Patient was prescribed a DJO Ultrasling IV Shoulder Brace. The right shoulder will require stabilization / immobilization from this orthosis. The orthosis will assist in protecting the affected area, provide functional support and facilitate healing. The device was ordered and fit on 01/19/2022.     The patient was educated and fit by a healthcare professional with expert knowledge and specialization in brace application while under the direct supervision of the treating physician. Verbal and written instructions for the use of and application of this item were provided. They were instructed to contact the office immediately should the brace result in increased pain, decreased sensation, increased swelling or worsening of the condition. Treatment Plan: We reviewed the clinical diagnosis and the MRI imaging with [facility] [place] [place] today's visit. We discussed diagnosis and treatment options in detail. We discussed the risks and benefits of the surgical intervention. It will comprise arthroscopic rotator cuff repair, subacromial decompression, biceps evaluation and probable biceps tenodesis. He is tender on his biceps with a positive Speed test. We discussed in detail risks, benefits and expectations postoperatively. Risks, benefits and potential complications of arthroscopic shoulder surgery were discussed with the patient. Risks discussed include but are not limited to bleeding, infection, anesthetic risk, injury to nerves and blood vessels, deep vein thrombosis, residual stiffness and weakness, and the need for revision surgery. The patient also understands that anesthetic risks include cardiopulmonary issues, drug reactions and even death. The patient voices an understanding of the importance of physical therapy and home exercises after surgery. All questions were answered and written informed consent for surgery was obtained today. We also discussed a recovery timeline following this operation. He will be starting physical therapy after his surgery at the [facility] location. We will see Mary Valiente back as needed. All questions were answered to patient's satisfaction and He was encouraged to call with any further questions or concerns. Juan Carlos Ambrocio is in agreement with this plan. Mary Alice Finnegan MD  [facility] Clinical fellow    1/19/2022  11:50 AM    During this examination, IMary Alice MD, functioned as a scribe for Dr. Lor Holly.  The history taking and physical examination were performed by Dr. Ze Crowley. All counseling during the appointment was performed between the patient and Dr. Ze Crowley. This dictation was performed with a verbal recognition program (DRAGON) and it was checked for errors. It is possible that there are still dictated errors within this office note. If so, please bring any areas to my attention for an addendum. All efforts were made to ensure that this office note is accurate.  ____________  I was physically present and personally supervised the Orthopaedic Sports Medicine Fellow in the evaluation and development of a treatment plan for this patient. I personally interviewed the patient and performed a physical examination. In addition, I discussed the patient's condition and treatment options with them. I have also reviewed and agree with the past medical, family and social history unless otherwise noted. All of the patient's questions were answered. Ike Crowley MD, PhD  1/19/2022

## 2022-01-21 ENCOUNTER — ANESTHESIA EVENT (OUTPATIENT)
Dept: OPERATING ROOM | Age: 47
End: 2022-01-21
Payer: COMMERCIAL

## 2022-01-24 ENCOUNTER — HOSPITAL ENCOUNTER (OUTPATIENT)
Age: 47
Setting detail: OUTPATIENT SURGERY
Discharge: HOME | End: 2022-01-24
Attending: ORTHOPAEDIC SURGERY | Admitting: ORTHOPAEDIC SURGERY
Payer: COMMERCIAL

## 2022-01-24 ENCOUNTER — ANESTHESIA (OUTPATIENT)
Dept: OPERATING ROOM | Age: 47
End: 2022-01-24
Payer: COMMERCIAL

## 2022-01-24 VITALS
DIASTOLIC BLOOD PRESSURE: 92 MMHG | TEMPERATURE: 97.8 F | WEIGHT: 262 LBS | OXYGEN SATURATION: 99 % | SYSTOLIC BLOOD PRESSURE: 137 MMHG | BODY MASS INDEX: 32.58 KG/M2 | HEART RATE: 76 BPM | HEIGHT: 75 IN | RESPIRATION RATE: 14 BRPM

## 2022-01-24 VITALS — TEMPERATURE: 95.7 F | OXYGEN SATURATION: 100 % | DIASTOLIC BLOOD PRESSURE: 68 MMHG | SYSTOLIC BLOOD PRESSURE: 116 MMHG

## 2022-01-24 DIAGNOSIS — M75.121 COMPLETE TEAR OF RIGHT ROTATOR CUFF, UNSPECIFIED WHETHER TRAUMATIC: Primary | ICD-10-CM

## 2022-01-24 PROCEDURE — 2580000003 HC RX 258: Performed by: ORTHOPAEDIC SURGERY

## 2022-01-24 PROCEDURE — 6360000002 HC RX W HCPCS: Performed by: ANESTHESIOLOGY

## 2022-01-24 PROCEDURE — 6370000000 HC RX 637 (ALT 250 FOR IP): Performed by: ORTHOPAEDIC SURGERY

## 2022-01-24 PROCEDURE — 3700000000 HC ANESTHESIA ATTENDED CARE: Performed by: ORTHOPAEDIC SURGERY

## 2022-01-24 PROCEDURE — C9290 INJ, BUPIVACAINE LIPOSOME: HCPCS | Performed by: ANESTHESIOLOGY

## 2022-01-24 PROCEDURE — 2720000010 HC SURG SUPPLY STERILE: Performed by: ORTHOPAEDIC SURGERY

## 2022-01-24 PROCEDURE — 2709999900 HC NON-CHARGEABLE SUPPLY: Performed by: ORTHOPAEDIC SURGERY

## 2022-01-24 PROCEDURE — 2500000003 HC RX 250 WO HCPCS: Performed by: ANESTHESIOLOGY

## 2022-01-24 PROCEDURE — 7100000001 HC PACU RECOVERY - ADDTL 15 MIN: Performed by: ORTHOPAEDIC SURGERY

## 2022-01-24 PROCEDURE — 2580000003 HC RX 258: Performed by: ANESTHESIOLOGY

## 2022-01-24 PROCEDURE — 3600000014 HC SURGERY LEVEL 4 ADDTL 15MIN: Performed by: ORTHOPAEDIC SURGERY

## 2022-01-24 PROCEDURE — A4217 STERILE WATER/SALINE, 500 ML: HCPCS | Performed by: ORTHOPAEDIC SURGERY

## 2022-01-24 PROCEDURE — 6360000002 HC RX W HCPCS: Performed by: NURSE ANESTHETIST, CERTIFIED REGISTERED

## 2022-01-24 PROCEDURE — 2500000003 HC RX 250 WO HCPCS: Performed by: NURSE ANESTHETIST, CERTIFIED REGISTERED

## 2022-01-24 PROCEDURE — 6360000002 HC RX W HCPCS: Performed by: ORTHOPAEDIC SURGERY

## 2022-01-24 PROCEDURE — 7100000011 HC PHASE II RECOVERY - ADDTL 15 MIN: Performed by: ORTHOPAEDIC SURGERY

## 2022-01-24 PROCEDURE — 64415 NJX AA&/STRD BRCH PLXS IMG: CPT | Performed by: ANESTHESIOLOGY

## 2022-01-24 PROCEDURE — 7100000000 HC PACU RECOVERY - FIRST 15 MIN: Performed by: ORTHOPAEDIC SURGERY

## 2022-01-24 PROCEDURE — 2580000003 HC RX 258: Performed by: NURSE ANESTHETIST, CERTIFIED REGISTERED

## 2022-01-24 PROCEDURE — 3700000001 HC ADD 15 MINUTES (ANESTHESIA): Performed by: ORTHOPAEDIC SURGERY

## 2022-01-24 PROCEDURE — 3600000004 HC SURGERY LEVEL 4 BASE: Performed by: ORTHOPAEDIC SURGERY

## 2022-01-24 PROCEDURE — C1713 ANCHOR/SCREW BN/BN,TIS/BN: HCPCS | Performed by: ORTHOPAEDIC SURGERY

## 2022-01-24 PROCEDURE — 2500000003 HC RX 250 WO HCPCS: Performed by: ORTHOPAEDIC SURGERY

## 2022-01-24 PROCEDURE — 7100000010 HC PHASE II RECOVERY - FIRST 15 MIN: Performed by: ORTHOPAEDIC SURGERY

## 2022-01-24 DEVICE — SCREW INTFR L15MM DIA5.5MM W/ SZ 2 FIBERWIRE SUT AND DISP: Type: IMPLANTABLE DEVICE | Site: SHOULDER | Status: FUNCTIONAL

## 2022-01-24 DEVICE — ANCHOR SUTURE BIOCOMP 4.75X19.1 MM SWIVELOCK C: Type: IMPLANTABLE DEVICE | Site: SHOULDER | Status: FUNCTIONAL

## 2022-01-24 DEVICE — ANCHOR SUT L14.7MM DIA5.5MM BIOCOMPOSITE W/ 3 SZ 2: Type: IMPLANTABLE DEVICE | Site: SHOULDER | Status: FUNCTIONAL

## 2022-01-24 RX ORDER — DOXYCYCLINE HYCLATE 100 MG
100 TABLET ORAL 2 TIMES DAILY
Qty: 6 TABLET | Refills: 0 | Status: SHIPPED | OUTPATIENT
Start: 2022-01-24 | End: 2022-01-27

## 2022-01-24 RX ORDER — DEXAMETHASONE SODIUM PHOSPHATE 4 MG/ML
INJECTION, SOLUTION INTRA-ARTICULAR; INTRALESIONAL; INTRAMUSCULAR; INTRAVENOUS; SOFT TISSUE PRN
Status: DISCONTINUED | OUTPATIENT
Start: 2022-01-24 | End: 2022-01-24 | Stop reason: SDUPTHER

## 2022-01-24 RX ORDER — DOCUSATE SODIUM 100 MG/1
100 CAPSULE, LIQUID FILLED ORAL 2 TIMES DAILY PRN
Qty: 20 CAPSULE | Refills: 0 | Status: SHIPPED | OUTPATIENT
Start: 2022-01-24 | End: 2022-02-03

## 2022-01-24 RX ORDER — PROPOFOL 10 MG/ML
INJECTION, EMULSION INTRAVENOUS PRN
Status: DISCONTINUED | OUTPATIENT
Start: 2022-01-24 | End: 2022-01-24 | Stop reason: SDUPTHER

## 2022-01-24 RX ORDER — SODIUM CHLORIDE 0.9 % (FLUSH) 0.9 %
5-40 SYRINGE (ML) INJECTION PRN
Status: DISCONTINUED | OUTPATIENT
Start: 2022-01-24 | End: 2022-01-24 | Stop reason: HOSPADM

## 2022-01-24 RX ORDER — ROCURONIUM BROMIDE 10 MG/ML
INJECTION, SOLUTION INTRAVENOUS PRN
Status: DISCONTINUED | OUTPATIENT
Start: 2022-01-24 | End: 2022-01-24 | Stop reason: SDUPTHER

## 2022-01-24 RX ORDER — OXYCODONE HYDROCHLORIDE AND ACETAMINOPHEN 5; 325 MG/1; MG/1
1 TABLET ORAL EVERY 4 HOURS PRN
Qty: 40 TABLET | Refills: 0 | Status: SHIPPED | OUTPATIENT
Start: 2022-01-24 | End: 2022-01-27

## 2022-01-24 RX ORDER — SODIUM CHLORIDE, SODIUM LACTATE, POTASSIUM CHLORIDE, CALCIUM CHLORIDE 600; 310; 30; 20 MG/100ML; MG/100ML; MG/100ML; MG/100ML
INJECTION, SOLUTION INTRAVENOUS CONTINUOUS PRN
Status: DISCONTINUED | OUTPATIENT
Start: 2022-01-24 | End: 2022-01-24 | Stop reason: SDUPTHER

## 2022-01-24 RX ORDER — MIDAZOLAM HYDROCHLORIDE 1 MG/ML
INJECTION INTRAMUSCULAR; INTRAVENOUS
Status: COMPLETED
Start: 2022-01-24 | End: 2022-01-24

## 2022-01-24 RX ORDER — SODIUM CHLORIDE 0.9 % (FLUSH) 0.9 %
5-40 SYRINGE (ML) INJECTION EVERY 12 HOURS SCHEDULED
Status: DISCONTINUED | OUTPATIENT
Start: 2022-01-24 | End: 2022-01-24 | Stop reason: HOSPADM

## 2022-01-24 RX ORDER — ONDANSETRON 4 MG/1
4 TABLET, FILM COATED ORAL EVERY 8 HOURS PRN
Qty: 15 TABLET | Refills: 0 | Status: SHIPPED | OUTPATIENT
Start: 2022-01-24 | End: 2022-02-18

## 2022-01-24 RX ORDER — SODIUM CHLORIDE, SODIUM LACTATE, POTASSIUM CHLORIDE, CALCIUM CHLORIDE 600; 310; 30; 20 MG/100ML; MG/100ML; MG/100ML; MG/100ML
INJECTION, SOLUTION INTRAVENOUS CONTINUOUS
Status: DISCONTINUED | OUTPATIENT
Start: 2022-01-24 | End: 2022-01-24 | Stop reason: HOSPADM

## 2022-01-24 RX ORDER — OXYCODONE HYDROCHLORIDE AND ACETAMINOPHEN 5; 325 MG/1; MG/1
1 TABLET ORAL
Status: COMPLETED | OUTPATIENT
Start: 2022-01-24 | End: 2022-01-24

## 2022-01-24 RX ORDER — FENTANYL CITRATE 50 UG/ML
INJECTION, SOLUTION INTRAMUSCULAR; INTRAVENOUS
Status: COMPLETED
Start: 2022-01-24 | End: 2022-01-24

## 2022-01-24 RX ORDER — ROPIVACAINE HYDROCHLORIDE 5 MG/ML
INJECTION, SOLUTION EPIDURAL; INFILTRATION; PERINEURAL
Status: DISCONTINUED
Start: 2022-01-24 | End: 2022-01-24 | Stop reason: HOSPADM

## 2022-01-24 RX ORDER — FENTANYL CITRATE 50 UG/ML
INJECTION, SOLUTION INTRAMUSCULAR; INTRAVENOUS PRN
Status: DISCONTINUED | OUTPATIENT
Start: 2022-01-24 | End: 2022-01-24 | Stop reason: SDUPTHER

## 2022-01-24 RX ORDER — ONDANSETRON 2 MG/ML
INJECTION INTRAMUSCULAR; INTRAVENOUS PRN
Status: DISCONTINUED | OUTPATIENT
Start: 2022-01-24 | End: 2022-01-24 | Stop reason: SDUPTHER

## 2022-01-24 RX ORDER — LIDOCAINE HYDROCHLORIDE 20 MG/ML
INJECTION, SOLUTION INTRAVENOUS PRN
Status: DISCONTINUED | OUTPATIENT
Start: 2022-01-24 | End: 2022-01-24 | Stop reason: SDUPTHER

## 2022-01-24 RX ORDER — BUPIVACAINE HYDROCHLORIDE 5 MG/ML
INJECTION, SOLUTION EPIDURAL; INTRACAUDAL
Status: COMPLETED
Start: 2022-01-24 | End: 2022-01-24

## 2022-01-24 RX ORDER — SODIUM CHLORIDE 9 MG/ML
25 INJECTION, SOLUTION INTRAVENOUS PRN
Status: DISCONTINUED | OUTPATIENT
Start: 2022-01-24 | End: 2022-01-24 | Stop reason: HOSPADM

## 2022-01-24 RX ORDER — BUPIVACAINE HYDROCHLORIDE 5 MG/ML
INJECTION, SOLUTION EPIDURAL; INTRACAUDAL PRN
Status: DISCONTINUED | OUTPATIENT
Start: 2022-01-24 | End: 2022-01-24 | Stop reason: SDUPTHER

## 2022-01-24 RX ORDER — MIDAZOLAM HYDROCHLORIDE 1 MG/ML
INJECTION INTRAMUSCULAR; INTRAVENOUS PRN
Status: DISCONTINUED | OUTPATIENT
Start: 2022-01-24 | End: 2022-01-24 | Stop reason: SDUPTHER

## 2022-01-24 RX ORDER — ASPIRIN 325 MG
325 TABLET, DELAYED RELEASE (ENTERIC COATED) ORAL 2 TIMES DAILY
Qty: 60 TABLET | Refills: 0 | Status: SHIPPED | OUTPATIENT
Start: 2022-01-24 | End: 2022-02-18

## 2022-01-24 RX ORDER — BUPIVACAINE HYDROCHLORIDE 5 MG/ML
INJECTION, SOLUTION EPIDURAL; INTRACAUDAL PRN
Status: DISCONTINUED | OUTPATIENT
Start: 2022-01-24 | End: 2022-01-24 | Stop reason: ALTCHOICE

## 2022-01-24 RX ADMIN — ROCURONIUM BROMIDE 30 MG: 10 INJECTION INTRAVENOUS at 15:40

## 2022-01-24 RX ADMIN — SUGAMMADEX 200 MG: 100 INJECTION, SOLUTION INTRAVENOUS at 17:42

## 2022-01-24 RX ADMIN — PROPOFOL 250 MG: 10 INJECTION, EMULSION INTRAVENOUS at 14:55

## 2022-01-24 RX ADMIN — PROPOFOL 50 MG: 10 INJECTION, EMULSION INTRAVENOUS at 15:43

## 2022-01-24 RX ADMIN — BUPIVACAINE 20 ML: 13.3 INJECTION, SUSPENSION, LIPOSOMAL INFILTRATION at 13:30

## 2022-01-24 RX ADMIN — PROPOFOL 50 MG: 10 INJECTION, EMULSION INTRAVENOUS at 16:06

## 2022-01-24 RX ADMIN — ROCURONIUM BROMIDE 50 MG: 10 INJECTION INTRAVENOUS at 14:55

## 2022-01-24 RX ADMIN — BUPIVACAINE HYDROCHLORIDE 20 ML: 5 INJECTION, SOLUTION EPIDURAL; INTRACAUDAL; PERINEURAL at 13:30

## 2022-01-24 RX ADMIN — PROPOFOL 50 MG: 10 INJECTION, EMULSION INTRAVENOUS at 16:26

## 2022-01-24 RX ADMIN — ROCURONIUM BROMIDE 20 MG: 10 INJECTION INTRAVENOUS at 16:02

## 2022-01-24 RX ADMIN — LIDOCAINE HYDROCHLORIDE 50 MG: 20 INJECTION, SOLUTION INTRAVENOUS at 14:55

## 2022-01-24 RX ADMIN — FENTANYL CITRATE 100 MCG: 50 INJECTION, SOLUTION INTRAMUSCULAR; INTRAVENOUS at 13:30

## 2022-01-24 RX ADMIN — ROCURONIUM BROMIDE 20 MG: 10 INJECTION INTRAVENOUS at 15:32

## 2022-01-24 RX ADMIN — ROCURONIUM BROMIDE 10 MG: 10 INJECTION INTRAVENOUS at 16:27

## 2022-01-24 RX ADMIN — OXYCODONE HYDROCHLORIDE AND ACETAMINOPHEN 1 TABLET: 5; 325 TABLET ORAL at 19:22

## 2022-01-24 RX ADMIN — ONDANSETRON 4 MG: 2 INJECTION INTRAMUSCULAR; INTRAVENOUS at 15:23

## 2022-01-24 RX ADMIN — SODIUM CHLORIDE, SODIUM LACTATE, POTASSIUM CHLORIDE, AND CALCIUM CHLORIDE: .6; .31; .03; .02 INJECTION, SOLUTION INTRAVENOUS at 14:35

## 2022-01-24 RX ADMIN — DEXAMETHASONE SODIUM PHOSPHATE 8 MG: 4 INJECTION, SOLUTION INTRAMUSCULAR; INTRAVENOUS at 15:23

## 2022-01-24 RX ADMIN — ROCURONIUM BROMIDE 30 MG: 10 INJECTION INTRAVENOUS at 16:55

## 2022-01-24 RX ADMIN — PHENYLEPHRINE HYDROCHLORIDE 30 MCG/MIN: 10 INJECTION INTRAVENOUS at 15:20

## 2022-01-24 RX ADMIN — SODIUM CHLORIDE, POTASSIUM CHLORIDE, SODIUM LACTATE AND CALCIUM CHLORIDE: 600; 310; 30; 20 INJECTION, SOLUTION INTRAVENOUS at 11:40

## 2022-01-24 RX ADMIN — MIDAZOLAM HYDROCHLORIDE 2 MG: 2 INJECTION, SOLUTION INTRAMUSCULAR; INTRAVENOUS at 13:30

## 2022-01-24 RX ADMIN — CEFAZOLIN SODIUM 2000 MG: 1 INJECTION, POWDER, FOR SOLUTION INTRAMUSCULAR; INTRAVENOUS at 14:58

## 2022-01-24 ASSESSMENT — PULMONARY FUNCTION TESTS
PIF_VALUE: 2
PIF_VALUE: 17
PIF_VALUE: 18
PIF_VALUE: 21
PIF_VALUE: 17
PIF_VALUE: 0
PIF_VALUE: 21
PIF_VALUE: 21
PIF_VALUE: 19
PIF_VALUE: 21
PIF_VALUE: 21
PIF_VALUE: 18
PIF_VALUE: 1
PIF_VALUE: 0
PIF_VALUE: 22
PIF_VALUE: 17
PIF_VALUE: 18
PIF_VALUE: 20
PIF_VALUE: 17
PIF_VALUE: 19
PIF_VALUE: 19
PIF_VALUE: 18
PIF_VALUE: 18
PIF_VALUE: 1
PIF_VALUE: 20
PIF_VALUE: 17
PIF_VALUE: 2
PIF_VALUE: 18
PIF_VALUE: 19
PIF_VALUE: 20
PIF_VALUE: 22
PIF_VALUE: 17
PIF_VALUE: 19
PIF_VALUE: 22
PIF_VALUE: 21
PIF_VALUE: 17
PIF_VALUE: 16
PIF_VALUE: 14
PIF_VALUE: 21
PIF_VALUE: 17
PIF_VALUE: 21
PIF_VALUE: 20
PIF_VALUE: 18
PIF_VALUE: 21
PIF_VALUE: 21
PIF_VALUE: 19
PIF_VALUE: 18
PIF_VALUE: 4
PIF_VALUE: 17
PIF_VALUE: 18
PIF_VALUE: 21
PIF_VALUE: 18
PIF_VALUE: 17
PIF_VALUE: 18
PIF_VALUE: 16
PIF_VALUE: 16
PIF_VALUE: 17
PIF_VALUE: 0
PIF_VALUE: 21
PIF_VALUE: 17
PIF_VALUE: 17
PIF_VALUE: 21
PIF_VALUE: 17
PIF_VALUE: 20
PIF_VALUE: 17
PIF_VALUE: 30
PIF_VALUE: 17
PIF_VALUE: 17
PIF_VALUE: 19
PIF_VALUE: 21
PIF_VALUE: 22
PIF_VALUE: 17
PIF_VALUE: 17
PIF_VALUE: 21
PIF_VALUE: 18
PIF_VALUE: 21
PIF_VALUE: 17
PIF_VALUE: 0
PIF_VALUE: 21
PIF_VALUE: 21
PIF_VALUE: 0
PIF_VALUE: 19
PIF_VALUE: 21
PIF_VALUE: 21
PIF_VALUE: 19
PIF_VALUE: 21
PIF_VALUE: 21
PIF_VALUE: 19
PIF_VALUE: 18
PIF_VALUE: 17
PIF_VALUE: 21
PIF_VALUE: 20
PIF_VALUE: 2
PIF_VALUE: 21
PIF_VALUE: 19
PIF_VALUE: 1
PIF_VALUE: 21
PIF_VALUE: 17
PIF_VALUE: 20
PIF_VALUE: 18
PIF_VALUE: 17
PIF_VALUE: 17
PIF_VALUE: 6
PIF_VALUE: 20
PIF_VALUE: 0
PIF_VALUE: 21
PIF_VALUE: 19
PIF_VALUE: 17
PIF_VALUE: 21
PIF_VALUE: 0
PIF_VALUE: 21
PIF_VALUE: 18
PIF_VALUE: 21
PIF_VALUE: 17
PIF_VALUE: 18
PIF_VALUE: 21
PIF_VALUE: 19
PIF_VALUE: 18
PIF_VALUE: 17
PIF_VALUE: 17
PIF_VALUE: 2
PIF_VALUE: 21
PIF_VALUE: 17
PIF_VALUE: 19
PIF_VALUE: 18
PIF_VALUE: 21
PIF_VALUE: 14
PIF_VALUE: 0
PIF_VALUE: 21
PIF_VALUE: 17
PIF_VALUE: 17
PIF_VALUE: 21
PIF_VALUE: 4
PIF_VALUE: 20
PIF_VALUE: 20
PIF_VALUE: 2
PIF_VALUE: 19
PIF_VALUE: 20
PIF_VALUE: 21
PIF_VALUE: 20
PIF_VALUE: 17
PIF_VALUE: 21
PIF_VALUE: 17
PIF_VALUE: 18
PIF_VALUE: 17
PIF_VALUE: 19
PIF_VALUE: 19
PIF_VALUE: 21
PIF_VALUE: 17
PIF_VALUE: 21
PIF_VALUE: 0
PIF_VALUE: 21
PIF_VALUE: 17
PIF_VALUE: 30
PIF_VALUE: 21
PIF_VALUE: 21
PIF_VALUE: 22
PIF_VALUE: 21
PIF_VALUE: 0
PIF_VALUE: 21
PIF_VALUE: 18
PIF_VALUE: 21
PIF_VALUE: 19
PIF_VALUE: 21
PIF_VALUE: 18
PIF_VALUE: 18
PIF_VALUE: 21
PIF_VALUE: 20
PIF_VALUE: 0
PIF_VALUE: 21
PIF_VALUE: 5

## 2022-01-24 ASSESSMENT — PAIN DESCRIPTION - PAIN TYPE: TYPE: ACUTE PAIN;SURGICAL PAIN

## 2022-01-24 ASSESSMENT — PAIN - FUNCTIONAL ASSESSMENT
PAIN_FUNCTIONAL_ASSESSMENT: 0-10

## 2022-01-24 ASSESSMENT — PAIN DESCRIPTION - FREQUENCY: FREQUENCY: CONTINUOUS

## 2022-01-24 ASSESSMENT — PAIN DESCRIPTION - LOCATION: LOCATION: SHOULDER

## 2022-01-24 ASSESSMENT — PAIN DESCRIPTION - DESCRIPTORS: DESCRIPTORS: ACHING;SORE;BURNING

## 2022-01-24 ASSESSMENT — PAIN DESCRIPTION - ORIENTATION: ORIENTATION: RIGHT

## 2022-01-24 ASSESSMENT — PAIN SCALES - GENERAL
PAINLEVEL_OUTOF10: 4
PAINLEVEL_OUTOF10: 0

## 2022-01-24 ASSESSMENT — LIFESTYLE VARIABLES: SMOKING_STATUS: 0

## 2022-01-24 NOTE — PROGRESS NOTES
1343 Time out performed by patient, this writer and Dr. Lola Cruz. Pt placed on monitor and O2 at 3L per nasal cannula. Pt medicated for procedure by Dr. Lola Cruz. 1345 Right Interscalene Nerve Block started. 1350 Right Interscalene Nerve Block completed. Pt tolerated procedure well. Vital signs remained stable throughout procedure. Call light within reach. Side rails up x 2 and bed in lowest locked position. Significant other at bedside. Encouraged to call for needs.

## 2022-01-24 NOTE — PROGRESS NOTES
All discharge instructions given to patient's friend, Sherrill Butcher, in lobby at [phone] with all information provided and no further questions. Digital thermometer provided to Sherrill Butcher. No further questions. Copy of instructions in folder personally given to Sherrill Butcher.

## 2022-01-24 NOTE — PROGRESS NOTES
Patient admitted to PACU #12 from OR per stretcher at [id number] s/p RIGHT SHOULDER EXAM UNDER ANESTHESIA, RIGHT SHOULDER ARTHROSCOPY, DIAGNOSTIC SCOPE, SCOPE DEBRIDEMENT, DECOMPRESSION, ARTHROSCOPIC ROTATOR CUFF REPAIR, OPEN BICEP TENODESIS (Right). Report received at bedside in PACU per CRNA and OR employee. Patient was reported to be hypotensive at times in OR which he received treatment for, see anesthesia record with no complications reported. Patient received at pre-op nerve block. Patient connected to PACU monitoring equipment. IVF's infusing with site unremarkable. Patient arrived to PACU responsive but not fully wakeful from anesthesia but with respirations easy, even and regular with no pain noted or verbalized. Right shoulder surgical dressing with DSD, ABD Pad and Medipore Tape remains C,D,I with no drainage noted. Ice pack applied. Right arm remains in an abductor sling. No further changes. Will continue to monitor.

## 2022-01-24 NOTE — H&P
[facility]  Department of General Surgery   Surgical Service   Pre-operative History and Physical  Last H & P within the last 30 days. DIAGNOSIS:   Strain of tendon of right rotator cuff, initial encounter [S46.011A]    Procedure(s):  RIGHT SHOULDER ARTHROSCOPY, ROTATOR CUFF REPAIR     History obtained from: Patient interview and EHR     HISTORY OF PRESENT ILLNESS:   The patient is a 42 y.o. male with c/o right shoulder pain and weakness in the setting of MRI demonstrated  high grade, partial-thickness tear of the rotator cuff. Their symptoms have been recalcitrant to conservative treatment and the patient presents today for the above procedure. Covid 19:  Patient denies fever, chills, worsening cough, or known exposure to Covid-19. Past Medical History:    History reviewed. No pertinent past medical history. Past Surgical History:        Procedure Laterality Date    WISDOM TOOTH EXTRACTION       Past Social History:  Social History     Socioeconomic History    Marital status: Legally      Spouse name: None    Number of children: None    Years of education: None    Highest education level: None   Occupational History    None   Tobacco Use    Smoking status: Never Smoker    Smokeless tobacco: Never Used   Vaping Use    Vaping Use: Never used   Substance and Sexual Activity    Alcohol use:  Yes     Alcohol/week: 6.0 standard drinks     Types: 6 Cans of beer per week     Comment: socially    Drug use: Never    Sexual activity: None   Other Topics Concern    None   Social History Narrative    None     Social Determinants of Health     Financial Resource Strain: Low Risk     Difficulty of Paying Living Expenses: Not hard at all   Food Insecurity: No Food Insecurity    Worried About Running Out of Food in the Last Year: Never true    Javi of Food in the Last Year: Never true   Transportation Needs:     Lack of Transportation (Medical): Not on file    Lack of Transportation (Non-Medical): Not on file   Physical Activity:     Days of Exercise per Week: Not on file    Minutes of Exercise per Session: Not on file   Stress:     Feeling of Stress : Not on file   Social Connections:     Frequency of Communication with Friends and Family: Not on file    Frequency of Social Gatherings with Friends and Family: Not on file    Attends Worship Services: Not on file    Active Member of [address] [place] [id number] or Organizations: Not on file    Attends Club or Organization Meetings: Not on file    Marital Status: Not on file   Intimate Partner Violence:     Fear of Current or Ex-Partner: Not on file    Emotionally Abused: Not on file    Physically Abused: Not on file    Sexually Abused: Not on file   Housing Stability:     Unable to Pay for Housing in the Last Year: Not on file    Number of [place] in the Last Year: Not on file    Unstable Housing in the Last Year: Not on file         Medications Prior to Admission:      None      Allergies:  Patient has no known allergies. PHYSICAL EXAM:      /87   Pulse 80   Temp 97.4 °F (36.3 °C) (Temporal)   Resp 15   Ht 6' 3\" (1.905 m)   Wt 262 lb (118.8 kg)   SpO2 100%   BMI 32.75 kg/m²      Airway:  Airway patent with no audible stridor    Heart:  Regular rate and rhythm, No murmur noted    Lungs:  No increased work of breathing, good air exchange, clear to auscultation bilaterally, no crackles or wheezing    Abdomen:  Soft, non-distended, non-tender, no masses palpated    ASSESSMENT AND PLAN    Patient is a 42 y.o. male with above specified procedure planned. 1.  The patients history and physical was obtained and signed off by the pre-admission testing department. Patient seen and focused exam done today- no new changes since last physical exam on 1/7/22    2. Access to ancillary services are available per request of the provider.     Kobe Hairston, APRN - CNP 1/24/2022

## 2022-01-24 NOTE — PROGRESS NOTES
Pt informed of delay ([phone]). Pt and significant other understanding. Call light within reach and encouraged to call for any needs.

## 2022-01-24 NOTE — ANESTHESIA PRE PROCEDURE
Department of Anesthesiology  Preprocedure Note       Name:  Evy Davis   Age:  42 y.o.  :  1975                                          MRN:  [id number]         Date:  2022      Surgeon: Selina Sethi):  Avelina Raymundo MD    Procedure: Procedure(s):  RIGHT SHOULDER ARTHROSCOPY, ROTATOR CUFF REPAIR    Medications prior to admission:   Prior to Admission medications    Not on File       Current medications:    Current Facility-Administered Medications   Medication Dose Route Frequency Provider Last Rate Last Admin    ceFAZolin (ANCEF) 3,000 mg in dextrose 5 % 100 mL IVPB  3,000 mg IntraVENous Once Avelina Ryamundo MD        lactated ringers infusion   IntraVENous Continuous Beata Brass, DO        lactated ringers infusion   IntraVENous Continuous Irais Galindo  mL/hr at 22 1140 New Bag at 22 1140    sodium chloride flush 0.9 % injection 5-40 mL  5-40 mL IntraVENous 2 times per day Irais Galindo MD        sodium chloride flush 0.9 % injection 5-40 mL  5-40 mL IntraVENous PRN Irais Galindo MD        0.9 % sodium chloride infusion  25 mL IntraVENous PRN Irais Galindo MD        midazolam (VERSED) 2 MG/2ML injection             fentaNYL (SUBLIMAZE) 100 MCG/2ML injection             ropivacaine (NAROPIN) 0.5% injection             bupivacaine (PF) (MARCAINE) 0.5 % injection                Allergies:  No Known Allergies    Problem List:    Patient Active Problem List   Diagnosis Code    Internal hemorrhoid, bleeding K64.8       Past Medical History:  History reviewed. No pertinent past medical history. Past Surgical History:        Procedure Laterality Date    WISDOM TOOTH EXTRACTION         Social History:    Social History     Tobacco Use    Smoking status: Never Smoker    Smokeless tobacco: Never Used   Substance Use Topics    Alcohol use:  Yes     Alcohol/week: 6.0 standard drinks     Types: 6 Cans of beer per week     Comment: socially Counseling given: Not Answered      Vital Signs (Current):   Vitals:    01/18/22 1500 01/24/22 1106   BP:  133/87   Pulse:  80   Resp:  15   Temp:  97.4 °F (36.3 °C)   TempSrc:  Temporal   SpO2:  100%   Weight: 262 lb (118.8 kg) 262 lb (118.8 kg)   Height: 6' 3\" (1.905 m) 6' 3\" (1.905 m)                                              BP Readings from Last 3 Encounters:   01/24/22 133/87   01/07/22 134/82   09/13/21 110/70       NPO Status:                                                                                 BMI:   Wt Readings from Last 3 Encounters:   01/24/22 262 lb (118.8 kg)   01/19/22 262 lb (118.8 kg)   01/07/22 262 lb (118.8 kg)     Body mass index is 32.75 kg/m². CBC:   Lab Results   Component Value Date    WBC 8.0 09/29/2020    RBC 4.50 09/29/2020    HGB 13.6 09/29/2020    HCT 40.3 09/29/2020    MCV 89.6 09/29/2020    RDW 13.5 09/29/2020     09/29/2020       CMP:   Lab Results   Component Value Date     09/13/2021    K 4.6 09/13/2021    K 3.8 09/29/2020     09/13/2021    CO2 24 09/13/2021    BUN 17 09/13/2021    CREATININE 1.0 09/13/2021    GFRAA >60 09/13/2021    AGRATIO 1.4 08/11/2015    LABGLOM >60 09/13/2021    GLUCOSE 89 09/13/2021    PROT 8.4 08/11/2015    CALCIUM 10.3 09/13/2021    BILITOT 0.5 08/11/2015    ALKPHOS 59 08/11/2015    AST 17 08/11/2015    ALT 16 08/11/2015       POC Tests: No results for input(s): POCGLU, POCNA, POCK, POCCL, POCBUN, POCHEMO, POCHCT in the last 72 hours.     Coags:   Lab Results   Component Value Date    PROTIME 10.5 08/11/2015    INR 0.97 08/11/2015    APTT 32.6 08/11/2015       HCG (If Applicable): No results found for: PREGTESTUR, PREGSERUM, HCG, HCGQUANT     ABGs: No results found for: PHART, PO2ART, YYD1UOK, UBU5RSJ, BEART, [id number]     Type & Screen (If Applicable):  No results found for: LABABO, LABRH    Drug/Infectious Status (If Applicable):  No results found for: HIV, HEPCAB    COVID-19 Screening (If Applicable): No results found for: COVID19        Anesthesia Evaluation  Patient summary reviewed and Nursing notes reviewed no history of anesthetic complications:   Airway: Mallampati: II  TM distance: >3 FB   Neck ROM: full  Mouth opening: > = 3 FB Dental: normal exam         Pulmonary:       (-) not a current smoker                           Cardiovascular:Negative CV ROS            Rhythm: regular  Rate: normal                    Neuro/Psych:   Negative Neuro/Psych ROS              GI/Hepatic/Renal: Neg GI/Hepatic/Renal ROS            Endo/Other:    (+) : arthritis:., .                 Abdominal:             Vascular: negative vascular ROS. Other Findings:             Anesthesia Plan      general and regional     ASA 1     (Interscalene nerve block PSR)  Induction: intravenous. MIPS: Prophylactic antiemetics administered. Anesthetic plan and risks discussed with patient. Plan discussed with CRNA.                   Darryle Pine, DO   1/24/2022

## 2022-01-25 ENCOUNTER — TELEPHONE (OUTPATIENT)
Dept: ORTHOPEDIC SURGERY | Age: 47
End: 2022-01-25

## 2022-01-25 NOTE — OP NOTE
[address] [facility]               [address]                                OPERATIVE REPORT    PATIENT NAME: Iraida Shepard                    :        1975  MED REC NO:   [id number]                          ROOM:  ACCOUNT NO:   [de-identified]                           ADMIT DATE: 2022  PROVIDER:     Marianne Quiroga MD    DATE OF PROCEDURE:  2022    PREOPERATIVE DIAGNOSES:  Right shoulder impingement, chronic rotator  cuff tear. POSTOPERATIVE DIAGNOSES:  Right shoulder impingement, chronic rotator  cuff tear with type 2 SLAP tear and biceps tendinopathy and  tenosynovitis. OPERATION PERFORMED:  Right shoulder examination under anesthesia;  diagnostic arthroscopy; arthroscopic extensive debridement of rotator  interval, subacromial bursa and rotator cuff biceps stump and entire  superior labrum, arthroscopic subacromial decompression with  acromioplasty, arthroscopic rotator cuff repair and open subpectoral  biceps tenodesis. ANESTHESIA:  General anesthesia, interscalene block. IV FLUIDS:  Per anesthesia record. ESTIMATED BLOOD LOSS:  25 mL. COMPLICATIONS:  None. SURGEON:  Marianne Quiroga MD    ASSISTANT:  Karolyn Pavon MD    IMPLANTS:  Arthrex BioComposite Corkscrew anchor 5.5 fully threaded  anchors x1 and Arthrex 4.75 BioComposite SwiveLock anchors x2 and one  Arthrex BioComposite tenodesis screw 5.5 mm x1. FINDINGS:  Examination under anesthesia revealed no focal motion  deficit, no hyperlaxity. Diagnostic arthroscopy revealed no obvious  type 2 SLAP tear and what appeared to be a full-thickness rotator cuff  tear. Immediately post interval, there was upper subscapularis fraying. This required suture to hold the tissues together. The biceps was  degenerative, amendable to tenotomy and exploration of bicipital groove  revealed extensive tenosynovitis.  The subacromial space had extensive  subdeltoid, stable margin with right articular-sided fraying of the rotator cuff  and confirmed the rotator cuff tear. We checked the subscapularis  footprint with a push and pull test, and it was intact although there  was some fraying and this was debrided. I placed a 7-mm cannula through  the anterior portal and through that we used an angled BirdBeak holding  a folded #2 Fiberwire suture to penetrate the upper subscapularis. We  used a needle to hold it in place, retrieved the loop and then used a  luggage tag stitch. This was reinforced with multiple half hitches. We  repaired the small area of subscapularis fissuring and fraying. Tail  cutter was used to leave the short tails. Prior to that, we had used  the ArthroCare device to open up the rotator interval down to the base  of the coracoid very thoroughly. We had very good visualization. At  this point, we percutaneously placed an 18-gauge spinal needle through  the biceps tendon, passed a #1 PDS suture through that needle, withdrew  the needle and retrieved the suture anteriorly. More medial to that, we  used an upbiting basket, tenotomized the biceps tendon right up to the  supraglenoid tubercle. We debrided the biceps stump using a shaver. We  also completed our footprint work with cautery removing all the  tenosynovitis and marking the footprint. We then redirected our  arthroscope through the same posterior portal above the rotator cuff  into the subacromial space. We established our lateral portal under  direct visualization and dilated with arthroscopic shaver and performed  a thorough bursectomy. I placed the arthroscope laterally, and we could  see the rotator cuff tear. We placed the shaver from posterior and  resected all the bursal folds and bursitis. We also used cautery to  resect the periosteum from the undersurface of the acromion and teased  off the coracoacromial ligament. This exposed a type 2 acromion.   We  used a shaver from anterior to remove the coracohumeral ligament and  additional bursal hypertrophy. We used the acromionizer yfn from  posterior to anterior to perform an acromioplasty. Cutting block  technique was used. We smoothed out undersurface of the bone converting  to a flat type 1 acromion. The [facility] joint was left alone. We beveled the  lateral edge of the acromion addressing the outlet stenosis. While  still viewing now with the arthroscope posteriorly, we established  accessory anterolateral portal, this allowed us to place our medial  anchor, but also this point to lift upon the rotator cuff with a probe. This allowed us to bring the yfn underneath and lightly abrade the  footprints. We had a broad footprint and this was lightly abraded. The  tear measured around 1.5 cm from front to back. We elected to proceed  with triangular configuration transosseous equivalent repair. We placed  a medial row anchor with single 5.5 triple layered anchor. We loaded  three #2 Arthrex sutures. This was done through accessory anterolateral  portal.  We placed a 40 mm long PassPort cannula laterally. Through  that, retrieved one limb of one of the sutures from that anchor and  loaded onto a Humpback Scorpion suture passer. We passed the suture  through the posteromedial aspect of the rotator cuff tear near the  muscle-tendon junction. Second limb was passed anterior to first where  first horizontal mattress suture. Second suture had both limbs passed  centrally and the third suture had both limbs passed anteriorly. We  tied the three horizontal mattress sutures. Again, we used a Revo knot  followed by alternating half hitches. We parked the suture tails for  suture ridge repair. After tying all three stitches, we retrieved one  limb of each of the three pairs.   These three strands were loaded on to  a 4.75 BioComposite SwiveLock anchor, this was placed through a punch  hole posterolateral with respect to the footprint. Flush cutter was  used to cut the tails short and a core suture was removed. The  remaining three strands were loaded on to a second SwiveLock anchor and  this was placed through a punch hole anterolateral, just behind the  bicipital groove. We cut the tails short, but at this time the core  suture was passed antegrade with the Humpback Scorpion stitch, and we  tied a simple stitch with a Revo knot and alternating half hitches. Tail cutter at this time was used to leave short tails. This completed  our suture bridge repair. We documented arthroscopic images. We  irrigated copiously. We then made a 3 cm longitudinal incision centered  over the muscle-tendon junction over the pectoralis and overlying the  axillary crease. Incision was carried out with a 15 blade through the  skin and subcutaneous tissues. Electrocautery for hemostasis. We  placed a Hohmann retractor, went through the deep fascia with Metzenbaum  scissors. Blunt dissection was carried out with the finger. We placed  the Hohmann retractor and retracted the conjoint muscle. We identified  the biceps tendon, delivered with an elevator. We removed quite bit of  tenosynovitis. We placed a #2 FiberLoop suture in a locking grasping  fashion, starting at the muscle tendon junction, working our way  proximally. Five passes were made and the tendon proximal to this was  excised. We tied a mulberry knot at both ends of the suture exiting the  tendon. We identified our tenodesis site just lateral to the latissimus  tendon. We used cautery to christian it. We drilled our guidewire from  front to back and reamed over the guidewires with a 6.0 reamer to  accommodate the interference screw that was 5.5. We irrigated copiously  and removed the guidewire. We loaded a 5.5 BioComposite tenodesis screw  on the cannulated  loop and #2 Arthrex suture was placed through  the  which allowed us to control the tendon.   We delivered the  tendon into bone window and secured to the interference screw until the  interference screw flushed with the anterior cortical surface. It was  stable on tug testing. We tied a pair of sutures within the  interference screw, pair of sutures within the tendon. This reinforced  the repair in a pants-over-vest type fashion and tails were cut short. We irrigated it copiously and closed the wound in layers. We used 3-0  Vicryl in interrupted inverted fashion to close the subcutaneous tissue,  routine closure with 4-0 Monocryl and Prineo dressing to close the skin,  as well as the arthroscopic portals anterolateral and posterior and  accessory anterolateral.  We infiltrated 0.5% Marcaine into the biceps  incision. A sterile compressive dressing was applied. The arm was  placed in a padded soft brace. The patient was repositioned in supine  position before this and promptly awakened from anesthesia having  tolerated the procedure well and taken from the operating room to the  recovery room in satisfactory condition. PLAN:  The patient will be discharged on oral analgesics with  instructions to begin outpatient physical therapy and follow up with me  in the office in one week as an outpatient.         Ellen Rodriguez MD    D: 01/24/2022 17:26:07       T: 01/24/2022 23:48:23     [email or web address]  Job#: [id number]     Doc#: [id number]    CC:

## 2022-01-25 NOTE — ANESTHESIA POSTPROCEDURE EVALUATION
Department of Anesthesiology  Postprocedure Note    Patient: Kellie Jacinto  MRN: [id number]  YOB: 1975  Date of evaluation: 1/24/2022  Time:  7:05 PM     Procedure Summary     Date: 01/24/22 Room / Location: [address] [place] [address] [address] [place] [address] / [facility]    Anesthesia Start: 4964 Anesthesia Stop: 5821    Procedure: RIGHT SHOULDER EXAM UNDER ANESTHESIA, RIGHT SHOULDER ARTHROSCOPY, DIAGNOSTIC SCOPE, SCOPE DEBRIDEMENT, DECOMPRESSION, ARTHROSCOPIC ROTATOR CUFF REPAIR, OPEN BICEP TENODESIS (Right ) Diagnosis:       Strain of tendon of right rotator cuff, initial encounter      (Strain of tendon of right rotator cuff, initial encounter [S46.011A])    Surgeons: Pedro Hedrick MD Responsible Provider: Avery Zelaya MD    Anesthesia Type: general, regional ASA Status: 1          Anesthesia Type: general, regional    Hue Phase I: Hue Score: 8    Hue Phase II:      Last vitals: Reviewed and per EMR flowsheets.        Anesthesia Post Evaluation    Patient location during evaluation: PACU  Patient participation: complete - patient participated  Level of consciousness: awake and alert  Pain score: 0  Airway patency: patent  Nausea & Vomiting: no nausea and no vomiting  Complications: no  Cardiovascular status: hemodynamically stable  Respiratory status: acceptable  Hydration status: euvolemic

## 2022-01-25 NOTE — PROGRESS NOTES
PACU Discharge Note    Current Allergies: Patient has no known allergies. Pt meets criteria for discharge to home per Saray Score and ASPAN standards. Discussed with patient and responsible individual receiving instructions how to measure pain per numerical scale and when to contact doctor if prescribed medications are not helping with post operative pain    Discharge instructions reviewed with patient and family. Both verbalized understanding of instructions. Gave patient and family opportunity to ask questions. All questions reviewed and answered. Documents signed and copy of discharge instructions given. Vitals:    01/24/22 1900   BP: (!) 137/92   Pulse: 76   Resp: 14   Temp: 97.8 °F (36.6 °C)   SpO2: 99%      BP within 20% of pt's admitting BP per SARAY SCORE      Intake/Output Summary (Last 24 hours) at 1/24/2022 1939  Last data filed at 1/24/2022 1922  Gross per 24 hour   Intake 1960 ml   Output --   Net 1960 ml     Right shoulder surgical sites covered with gauze and medipore tape. Dressing is CDI. Shoulder immobilizing sling in place on right shoulder. Pain assessment:  present - adequately treated  Pain Level: 4  (Pt received PO Percocet prior to discharge). Offered patient opportunity to use restroom prior to discharge. Patient declined.     Patient discharged to home/self care via wheel chair by transporter/RN with a responsible individual.      1/24/2022 7:39 PM

## 2022-01-26 ENCOUNTER — TELEPHONE (OUTPATIENT)
Dept: ORTHOPEDIC SURGERY | Age: 47
End: 2022-01-26

## 2022-01-26 DIAGNOSIS — M67.911 ROTATOR CUFF DYSFUNCTION, RIGHT: Primary | ICD-10-CM

## 2022-01-26 RX ORDER — HYDROCODONE BITARTRATE AND ACETAMINOPHEN 5; 325 MG/1; MG/1
1 TABLET ORAL EVERY 4 HOURS PRN
Qty: 40 TABLET | Refills: 0 | Status: SHIPPED | OUTPATIENT
Start: 2022-01-26 | End: 2022-02-02

## 2022-01-26 NOTE — TELEPHONE ENCOUNTER
Patient called in to the on call service. He states he has been taking 1 Percocet today without any problem. When he took 2 he started itching and had a brief episode of palpitations. He has not experienced any chest pain or SOB. Palpitations have stopped. Advised patient to go to ER if he experiences chest pain, SOB, or any other concerns. He is still itchy and I recommended he take Benadryl if needed and not take 2 percocet at a time.        Electronically signed by Kathrine Lozano PA-C on 0/35/7775  Board Certified [facility]

## 2022-01-26 NOTE — PROGRESS NOTES
I spoke with the patient on the phone. He is having palpitations and itching when taking percocet. We recommend to switch to Norco. The  Patient was advised to stop taking Percocet. We will see him tomorrow morning in for his post operative clinic visit.     Garret Rcok MD  [facility] Clinical fellow

## 2022-01-27 ENCOUNTER — OFFICE VISIT (OUTPATIENT)
Dept: ORTHOPEDIC SURGERY | Age: 47
End: 2022-01-27

## 2022-01-27 VITALS — WEIGHT: 262 LBS | BODY MASS INDEX: 32.58 KG/M2 | HEIGHT: 75 IN

## 2022-01-27 DIAGNOSIS — S46.911A STRAIN OF RIGHT SHOULDER, INITIAL ENCOUNTER: ICD-10-CM

## 2022-01-27 DIAGNOSIS — M67.911 ROTATOR CUFF DYSFUNCTION, RIGHT: ICD-10-CM

## 2022-01-27 DIAGNOSIS — Z98.890 S/P RIGHT ROTATOR CUFF REPAIR: Primary | ICD-10-CM

## 2022-01-27 PROCEDURE — 99024 POSTOP FOLLOW-UP VISIT: CPT | Performed by: ORTHOPAEDIC SURGERY

## 2022-01-27 NOTE — PROGRESS NOTES
History of Present Illness:  Jocelyne Joseph is a pleasant 42 y.o. male who presents for a post operative visit. He is 3 days out following a right shoulder arthroscopic subacromial decompression, rotator cuff repair, open subpectoral biceps tenodesis. Overall He is doing okay and feels that their pain is well controlled with current pain medications. Patient is doing fine with the Norco, he stopped Percocet due to side effects. . He has been compliant with wearing the UltraSling brace at all times. He plans to begin his physical therapy tomorrow. He denies fevers, chills, numbness, tingling, and shortness of breath. Medical History:  Patient's medications, allergies, past medical, surgical, social and family histories were reviewed and updated as appropriate. No notes on file    Review of Systems  A 14 point review of systems was completed by the patient on 1/24/22 and is available in the media section of the scanned medical record and was reviewed on 1/27/2022. Vital Signs: There were no vitals filed for this visit. General/Appearance: Alert and oriented and in no apparent distress. Skin:  There are no skin lesions, cellulitis, or extreme edema. The patient has warm and well-perfused Bilateral upper extremities with brisk capillary refill. Right shoulder Exam:    Inspection: Shoulder incisions are clean, dry and intact and well approximated. The Prineo dressing is still in place. Mild ecchymosis and swelling are present as can be expected. There is no erythema, drainage or other signs of infection    Palpation:  No crepitus to gentle motion    Active Range of Motion: Deferred    Passive Range of Motion: Deferred    Strength:  Deferred    Special Tests:  Deferred. Neurovascular: Sensation to light touch is intact, no motor deficits, palpable radial pulses 2+    Radiology:       No new XR obtained at this time.          Assessment :  Mr. Jocelyne Joseph is a pleasant 42 y.o. patient who is 3 days status post right shoulder subacromial decompression, rotator cuff repair, open biceps tenodesis. Impression:  Encounter Diagnoses   Name Primary?  Rotator cuff dysfunction, right     Strain of right shoulder, initial encounter     S/P right rotator cuff repair Yes       Office Procedures:  Orders Placed This Encounter   Procedures    [facility] - [facility] Physical Therapy     Referral Priority:   Routine     Referral Type:   Eval and Treat     Referral Reason:   Specialty Services Required     Requested Specialty:   Physical Therapy     Number of Visits Requested:   1       Treatment Plan:    Overall Nila Perkins is doing well. The pain is well-controlled. We recommend that He wear the UltraSling brace at all times with the exception of clothing, bathing and physical therapy. The patient was told that he is restricted from driving for at least 3 weeks postop . All of his questions were fully answered today. We would like to see Nila Perkins back in 2 weeks for follow-up visit. Nila Perkins is in agreement with this plan. Carline Buckley MD  [facility] Clinical fellow    1/27/2022  9:49 AM    During this examination, I, Carline Buckley MD, functioned as a scribe for Dr. Leisa Croft. The history taking and physical examination were performed by Dr. Allyson Coleman. All counseling during the appointment was performed between the patient and Dr. Allyson Coleman.   ______________________  I was physically present and personally supervised the Orthopaedic Sports Medicine Fellow in the evaluation and development of a treatment plan for this patient. I personally interviewed the patient and performed a physical examination. In addition, I discussed the patient's condition and treatment options with them. I have also reviewed and agree with the past medical, family and social history unless otherwise noted. All of the patient's questions were answered. Ike Coleman MD, PhD  1/27/2022

## 2022-01-28 ENCOUNTER — HOSPITAL ENCOUNTER (OUTPATIENT)
Dept: PHYSICAL THERAPY | Age: 47
Setting detail: THERAPIES SERIES
Discharge: HOME | End: 2022-01-28
Payer: COMMERCIAL

## 2022-01-28 PROCEDURE — 97110 THERAPEUTIC EXERCISES: CPT

## 2022-01-28 PROCEDURE — 97530 THERAPEUTIC ACTIVITIES: CPT

## 2022-01-28 PROCEDURE — 97161 PT EVAL LOW COMPLEX 20 MIN: CPT

## 2022-01-28 NOTE — PLAN OF CARE
[address] 79, [address] [place], [address]  Phone: [phone]   Fax: [phone]        Physical Therapy Certification    Dear Referring Practitioner: Bill Minor,    We had the pleasure of evaluating the following patient for physical therapy services at [address] [place] [address]. A summary of our findings can be found in the initial assessment below. This includes our plan of care. If you have any questions or concerns regarding these findings, please do not hesitate to contact me at the office phone number checked above. Thank you for the referral.       Physician Signature:_______________________________Date:__________________  By signing above (or electronic signature), therapists plan is approved by physician      Patient: Carrie Atkins   : 1975   MRN: [id number]  Referring Physician: Referring Practitioner: Bill Minor      Evaluation Date: 2022      Medical Diagnosis Information:  Diagnosis: X02.458R Right Rotator cuff tear   Treatment Diagnosis: S46.011A Right Rotator cuff tear                                           Precautions/ Contra-indications: see Letters   Latex Allergy:  [x]NO      []YES  Preferred Language for Healthcare:   [x]English       []Other:    C-SSRS Triggered by Intake questionnaire (Past 2 wk assessment):   [x] No, Questionnaire did not trigger screening.   [] Yes, Patient intake triggered further evaluation      [] C-SSRS Screening completed  [] PCP notified via Plan of Care  [] Emergency services notified     SUBJECTIVE:   Per MD 10/12: Carrie Atkins is a 42 y.o. male who presents to our office today complaining of  right shoulder pain. This patient unfortunately had a work-related injury. 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 He reports that these are somewhat heavy trophies made out a marble.   He reports on 2020 he was lifting several [employer] and felt a sharp pain in his right shoulder. Since then he has continued to have persistent pain and has noted weakness with overhead lifting. Patient reports that prior to this injury he never had problems with his right shoulder. He has never had to seek medical care for the right shoulder also. He reports that it is starting to interfere with his sleep at nighttime. He reports that he has not had any treatment for this so far. He was trying to manage it on his own but his symptoms continued to stay persistent. 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 Per MD note 1/19: Scott Smyth is a pleasant, 42 y.o., male, here today for his preoperative appointment. To recap, this patient sustained a work-related injury on 11/18/20 which resulted in persistent pain and weakness with daily activities, especially overhead activities. This is being covered under [facility]. The MRI confirmed the diagnosis of rotator cuff tear and showed a partial thickness of at least 50% of the depth of the distal supraspinatus and infraspinatus insertion with a SLAP 2C tear. He has had no real change in symptoms since his previously evaluation. He reports no new injuries or setbacks. Today: Pt is 4 days s/p right shoulder arthroscopic subacromial decompression, rotator cuff repair, open subpectoral biceps tenodesis. Patient is doing fine with the Norco, he stopped Percocet due to side effects.     Relevant Medical History: nonsignificant   Functional Disability Index: UEFI     Pain Scale: 0/10 with hydrocodone   Easing factors: rest, ice, pain medication   Provocative factors: reaching, lifting, carrying      Type: []Constant   []Intermittent  []Radiating []Localized []other:     Numbness/Tingling: slight numbness into fingers still remaining     Functional Limitations/Impairments: []Lifting/reaching []Grooming []Carrying []ADL's []Driving []Sports/Recreations   []Other:    Occupation/School: [employer] - build and transport trophies     Living Status/Prior Level of Function: Independent with ADLs and IADLs, hiking     OBJECTIVE:      CERV ROM     Cervical Flexion     Cervical Extension     Cervical SB     Cervical rotation     Reflexes/Sensation (myotomes/dermatomes): intact    Joint mobility:    []Normal    [x]Hypo: normal postop stiffness d/t swelling    []Hyper    Palpation:     Functional Mobility/Transfers:     Posture:     Bandages/Dressings/Incisions: bandages changed 1/27 by MD office, did not remove today    Gait: (include devices/WB status) wfl    Orthopedic Special Tests: nt dt postop status         Palpation Scale- Makayla and Mino- (Grade 0-4)     Description X / -- Comments   Grade 0 No tenderness     Grade 1 Mild tenderness without grimace or flinch     Grade 2  Moderate tenderness plus grimace or flinch X Axillary incision > tenderness than scope incisions    Grade 3  Severe tenderness plus marked flinch or withdrawal     Grade 4 Unbearable tenderness; patient withdrawals with light touch      DR Makayla, Morenita Vergara GM. Myofascial trigger points show spontaneous needle emg activity. Spine 1993; 18 :[id number]. [x] Patient history, allergies, meds reviewed. Medical chart reviewed. See intake form. Review Of Systems (ROS):  [x]Performed Review of systems (Integumentary, CardioPulmonary, Neurological) by intake and observation. Intake form has been scanned into medical record. Patient has been instructed to contact their primary care physician regarding ROS issues if not already being addressed at this time.       Co-morbidities/Complexities (which will affect course of rehabilitation):   []None           Arthritic conditions   []Rheumatoid arthritis (M05.9)  []Osteoarthritis (M19.91)   Cardiovascular conditions   []Hypertension (I10)  []Hyperlipidemia (E78.5)  []Angina pectoris (I20)  []Atherosclerosis (I70)   Musculoskeletal conditions   []Disc pathology   []Congenital spine pathologies   []Prior surgical intervention  []Osteoporosis (M81.8)  []Osteopenia (M85.8)   Endocrine conditions   []Hypothyroid (E03.9)  []Hyperthyroid Gastrointestinal conditions   []Constipation (A41.92)   Metabolic conditions   []Morbid obesity (E66.01)  []Diabetes type 1(E10.65) or 2 (E11.65)   []Neuropathy (G60.9)     Pulmonary conditions   []Asthma (J45)  []Coughing   []COPD (J44.9)   Psychological Disorders  [x]Anxiety (F41.9)  []Depression (F32.9)   []Other:   []Other:          Barriers to/and or personal factors that will affect rehab potential:              []Age  []Sex              []Motivation/Lack of Motivation                        []Co-Morbidities              []Cognitive Function, education/learning barriers              []Environmental, home barriers              []profession/work barriers  []past PT/medical experience  []other:       Falls Risk Assessment (30 days):   [x] Falls Risk assessed and no intervention required.   [] Falls Risk assessed and Patient requires intervention due to being higher risk   TUG score (>12s at risk):     [] Falls education provided, including       Functional Assessment:    Functional Assessment scale used: UEFI   Score: 100% disability       ASSESSMENT:   Functional Impairments   []Noted spinal or UE joint hypomobility   []Noted spinal or UE joint hypermobility   [x]Decreased UE functional ROM   [x]Decreased UE functional strength   []Abnormal reflexes/sensation/myotomal/dermatomal deficits   [x]Decreased RC/scapular/core strength and neuromuscular control   []other:      Functional Activity Limitations (from functional questionnaire and intake)   [x]Reduced ability to tolerate prolonged functional positions   [x]Reduced ability or difficulty with changes of positions or transfers between positions   []Reduced ability to maintain good posture and demonstrate good body mechanics with sitting, bending, and lifting   [x] Reduced ability or tolerance with driving and/or computer work   [x]Reduced ability to sleep   [x]Reduced ability to perform lifting, reaching, carrying tasks   [x]Reduced ability to tolerate impact through UE   [x]Reduced ability to reach behind back   [x]Reduced ability to  or hold objects   [x]Reduced ability to throw or toss an object   []other:    Participation Restrictions   [x]Reduced participation in self care activities   [x]Reduced participation in home management activities   [x]Reduced participation in work activities   []Reduced participation in social activities. [x]Reduced participation in sport/recreation activities. Classification:   [x]Signs/symptoms consistent with post-surgical status including decreased ROM, strength and function.   []Signs/symptoms consistent with joint sprain/strain   []Signs/symptoms consistent with shoulder impingement   []Signs/symptoms consistent with shoulder/elbow/wrist tendinopathy   []Signs/symptoms consistent with Rotator cuff tear   []Signs/symptoms consistent with labral tear   []Signs/symptoms consistent with postural dysfunction    []Signs/symptoms consistent with Glenohumeral IR Deficit - <45 degrees   []Signs/symptoms consistent with facet dysfunction of cervical/thoracic spine    []Signs/symptoms consistent with pathology which may benefit from Dry needling     []other:     Prognosis/Rehab Potential:      []Excellent   [x]Good    []Fair   []Poor    Tolerance of evaluation/treatment:    []Excellent   [x]Good    []Fair   []Poor  PLAN:  Frequency/Duration:  1-2 days per week for 12-16+ Weeks:  INTERVENTIONS:  [x] Therapeutic exercise including: strength training, ROM, for Upper extremity and core   [x]  NMR activation and proprioception for UE, scap and Core   [x] Manual therapy as indicated for shoulder, scapula and spine to include: Dry Needling/IASTM, STM, PROM, Gr I-IV mobilizations, manipulation. [x] Modalities as needed that may include: thermal agents, E-stim, Biofeedback, US, iontophoresis as indicated  [x] Patient education on joint protection, postural re-education, activity modification, progression of HEP. HEP instruction:   Access Code: [email or web address]  URL: [email or web address]  Date: 01/28/2022  Prepared by: Naseem Samaniego    Exercises  Seated Shoulder Flexion Towel Slide at Table Top - 1 x daily - 7 x weekly - 1 sets - 10 reps - 10 hold  Supported Elbow Flexion Extension PROM - 1 x daily - 7 x weekly - 1 sets - 10 reps - 10 hold  Seated Shoulder Shrugs - 1 x daily - 7 x weekly - 3 sets - 10 reps  Seated Scapular Retraction - 1 x daily - 7 x weekly - 3 sets - 10 reps  Seated Gripping Towel - 1 x daily - 7 x weekly - 3 sets - 10 reps      GOALS:  Patient stated goal: return to work duties without pain    [] Progressing: [] Met: [] Not Met: [] Adjusted    Therapist goals for Patient:   Short Term Goals: To be achieved in: 2-6 weeks  1. Independent in HEP and progression per patient tolerance, in order to prevent re-injury. [] Progressing: [] Met: [] Not Met: [] Adjusted   2. Patient will have a decrease in pain to facilitate improvement in movement, function, and ADLs as indicated by Functional Deficits. [] Progressing: [] Met: [] Not Met: [] Adjusted   3. Patient will demonstrate increased AROM to 120+ flex, 60+ ERto allow for proper joint functioning as indicated by patients Functional Deficits. 6 weeks   [] Progressing: [] Met: [] Not Met: [] Adjusted  4. Patient will return to ADLs such as bathing/dressing/grooming without increased symptoms or restriction. 6 weeks  [] Progressing: [] Met: [] Not Met: [] Adjusted    Long Term Goals: To be achieved in: 12-16+ weeks  1. Disability index score of 20% or less for the UEFI to assist with reaching prior level of function. [] Progressing: [] Met: [] Not Met: [] Adjusted  2.  Patient will demonstrate increased AROM to 150+ flex, 80+ ERto allow for proper joint functioning as indicated by patients Functional Deficits. [] Progressing: [] Met: [] Not Met: [] Adjusted  3. Patient will demonstrate an increase in Strength to 4/5 or greater to allow for proper functional mobility as indicated by patients Functional Deficits. [] Progressing: [] Met: [] Not Met: [] Adjusted  4. Patient will return to reaching/lifting/carrying without increased symptoms or restriction. [] Progressing: [] Met: [] Not Met: [] Adjusted  5. Patient will be able to lift/carry greater than 25lbs without increased symptoms or restriction in order to return to work duties. [] Progressing: [] Met: [] Not Met: [] Adjusted      Physical Therapy Evaluation Complexity Justification  [] A history of present problem with:  [] no personal factors and/or comorbidities that impact the plan of care;  [x]1-2 personal factors and/or comorbidities that impact the plan of care  []3 personal factors and/or comorbidities that impact the plan of care  [] An examination of body systems using standardized tests and measures addressing any of the following: body structures and functions (impairments), activity limitations, and/or participation restrictions;:  [] a total of 1-2 or more elements   [x] a total of 3 or more elements   [] a total of 4 or more elements   [] A clinical presentation with:  [x] stable and/or uncomplicated characteristics   [] evolving clinical presentation with changing characteristics  [] unstable and unpredictable characteristics;   [] Clinical decision making of [] low, [] moderate, [] high complexity using standardized patient assessment instrument and/or measurable assessment of functional outcome.     [x] EVAL (LOW) 82251 (typically 20 minutes face-to-face)  [] EVAL (MOD) 07054 (typically 30 minutes face-to-face)  [] EVAL (HIGH) 36281 (typically 45 minutes face-to-face)  [] RE-EVAL 32300    Electronically signed by:    Claire Holderch, PT, DPT, Cert DN

## 2022-01-28 NOTE — FLOWSHEET NOTE
[facility], [place]      Physical Therapy Daily Treatment Note  Date:  2022    Patient Name:  Francisco Moreno    :  1975  MRN: [id number]  Medical/Treatment Diagnosis Information:  · Diagnosis: S46.011A Right Rotator cuff tear  · Treatment Diagnosis: S46.011A Right Rotator cuff tear  Insurance/Certification information:  PT Insurance Information: [facility]  Physician Information:  Referring Practitioner: Gomez Renteria  Has the plan of care been signed (Y/N):        []  Yes  [x]  No     Date of Patient follow up with Physician: 22      Is this a Progress Report:     []  Yes  [x]  No        Progress report will be due (10 Rx or 30 days whichever is less): 62       Recertification will be due (POC Duration  / 90 days whichever is less):          Visit # Insurance Allowable Auth Required   1 18 12/3/21-22 []  Yes []  No        Functional Scale: UEFI 100%     Date assessed:  22      Latex Allergy:  [x]NO      []YES  Preferred Language for Healthcare:   [x]English       []other:    Pain level:  4-5/10     SUBJECTIVE:  See eval    OBJECTIVE: See eval   Observation:    Test measurements:      ROM PROM AROM  Comment    L R L R    Flexion        Abduction        ER        IR        Other        Other             Strength L R Comment   Flexion      Abduction      ER      IR      Supraspinatus      Upper Trap      Lower Trap      Mid Trap      Rhomboids      Biceps      Triceps      Horizontal Abduction      Horizontal Adduction      Lats          RESTRICTIONS/PRECAUTIONS: sp R RTC repair w/biceps tenodesis and subacromial decomp; see Letters       Exercises:  Exercise/Equipment Resistance Repetitions Other comments   Stretching/PROM      Wand      Table Slides  Flexion 5x10    UE Levant      Pulleys      Pendulum  5x10 seated     PROM Elbow  10x10    Isometrics      Retraction        3'    Weight shift      Flexion      Abduction      External Rotation      Internal Rotation      Biceps      Triceps            PRE's      Flexion      Abduction      External Rotation      Internal Rotation      Shrugs  x10    EXT      Reverse Flys      Serratus      Horizontal Abd with ER      Biceps      Triceps      Retraction  x10          Cable Column/Theraband      External Rotation      Internal Rotation      Shrugs      Lats      Ext      Flex      Scapular Retraction      BIC      TRIC      PNF            Dynamic Stability            Plyoback                Therapeutic Exercise and NMR EXR  [] (58251) Provided verbal/tactile cueing for activities related to strengthening, flexibility, endurance, ROM  for improvements in scapular, scapulothoracic and UE control with self care, reaching, carrying, lifting, house/yardwork, driving/computer work.    [] (18533) Provided verbal/tactile cueing for activities related to improving balance, coordination, kinesthetic sense, posture, motor skill, proprioception  to assist with  scapular, scapulothoracic and UE control with self care, reaching, carrying, lifting, house/yardwork, driving/computer work. Therapeutic Activities:    [] (22733 or 63410) Provided verbal/tactile cueing for activities related to improving balance, coordination, kinesthetic sense, posture, motor skill, proprioception and motor activation to allow for proper function of scapular, scapulothoracic and UE control with self care, carrying, lifting, driving/computer work.      Home Exercise Program:    [x] (24197) Reviewed/Progressed HEP activities related to strengthening, flexibility, endurance, ROM of scapular, scapulothoracic and UE control with self care, reaching, carrying, lifting, house/yardwork, driving/computer work  [] (91095) Reviewed/Progressed HEP activities related to improving balance, coordination, kinesthetic sense, posture, motor skill, proprioception of scapular, scapulothoracic and UE control with self care, reaching, carrying, lifting, house/yardwork, driving/computer work      Manual Treatments:  PROM / STM / Oscillations-Mobs:  G-I, II, III, IV (PA's, Inf., Post.)  [] (23288) Provided manual therapy to mobilize soft tissue/joints of cervical/CT, scapular GHJ and UE for the purpose of modulating pain, promoting relaxation,  increasing ROM, reducing/eliminating soft tissue swelling/inflammation/restriction, improving soft tissue extensibility and allowing for proper ROM for normal function with self care, reaching, carrying, lifting, house/yardwork, driving/computer work    Modalities:      Charges: 9484-2557  Timed Code Treatment Minutes: 30   Total Treatment Minutes: 50     [x] EVAL (LOW) 86388   [] EVAL (MOD) 90807   [] EVAL (HIGH) 45469   [] RE-EVAL   [x] NR(19874) x   1 ( 0845-1040, 15)  [] IONTO  [] NMR (82888) x     [] VASO  [] Manual (25065) x      [] Other:  [x] TA x  1 (5814-8119, 15)   [] Mech Traction (54350)  [] ES(attended) (03971)      [] ES (un) (28182):       HEP instruction:   Access Code: [email or web address]  URL: [email or web address]  Date: 01/28/2022  Prepared by: Blaise Camargo    Exercises  Seated Shoulder Flexion Towel Slide at Table Top - 1 x daily - 7 x weekly - 1 sets - 10 reps - 10 hold  Supported Elbow Flexion Extension PROM - 1 x daily - 7 x weekly - 1 sets - 10 reps - 10 hold  Seated Shoulder Shrugs - 1 x daily - 7 x weekly - 3 sets - 10 reps  Seated Scapular Retraction - 1 x daily - 7 x weekly - 3 sets - 10 reps  Seated Gripping Towel - 1 x daily - 7 x weekly - 3 sets - 10 reps      GOALS:  Patient stated goal: return to work duties without pain    [] Progressing: [] Met: [] Not Met: [] Adjusted    Therapist goals for Patient:   Short Term Goals: To be achieved in: 2-6 weeks  1. Independent in HEP and progression per patient tolerance, in order to prevent re-injury. [] Progressing: [] Met: [] Not Met: [] Adjusted   2.  Patient will have a decrease in pain to facilitate improvement in movement, function, and ADLs as indicated by Functional Deficits. [] Progressing: [] Met: [] Not Met: [] Adjusted   3. Patient will demonstrate increased AROM to 120+ flex, 60+ ERto allow for proper joint functioning as indicated by patients Functional Deficits. 6 weeks   [] Progressing: [] Met: [] Not Met: [] Adjusted  4. Patient will return to ADLs such as bathing/dressing/grooming without increased symptoms or restriction. 6 weeks  [] Progressing: [] Met: [] Not Met: [] Adjusted    Long Term Goals: To be achieved in: 12-16+ weeks  1. Disability index score of 20% or less for the UEFI to assist with reaching prior level of function. [] Progressing: [] Met: [] Not Met: [] Adjusted  2. Patient will demonstrate increased AROM to 150+ flex, 80+ ERto allow for proper joint functioning as indicated by patients Functional Deficits. [] Progressing: [] Met: [] Not Met: [] Adjusted  3. Patient will demonstrate an increase in Strength to 4/5 or greater to allow for proper functional mobility as indicated by patients Functional Deficits. [] Progressing: [] Met: [] Not Met: [] Adjusted  4. Patient will return to reaching/lifting/carrying without increased symptoms or restriction. [] Progressing: [] Met: [] Not Met: [] Adjusted  5. Patient will be able to lift/carry greater than 25lbs without increased symptoms or restriction in order to return to work duties. [] Progressing: [] Met: [] Not Met: [] Adjusted    Overall Progression Towards Functional goals/ Treatment Progress Update:  [] Patient is progressing as expected towards functional goals listed. [] Progression is slowed due to complexities/Impairments listed. [] Progression has been slowed due to co-morbidities.   [x] Plan just implemented, too soon to assess goals progression <30days   [] Goals require adjustment due to lack of progress  [] Patient is not progressing as expected and requires additional follow up with physician  [] Other    Prognosis for POC: [x] Good [] Fair  [] Poor      Patient requires continued skilled intervention: [x] Yes  [] No    Treatment/Activity Tolerance:  [x] Patient able to complete treatment  [] Patient limited by fatigue  [] Patient limited by pain     [] Patient limited by other medical complications  [] Other: Discussed post op protocol and precautions with pt and his wife. He was instructed that per MD note he can not drive until 3 weeks postop, pt verbalized understanding. Return to Play: (if applicable)   []  Stage 1: Intro to Strength   []  Stage 2: Return to Run and Strength   []  Stage 3: Return to Jump and Strength   []  Stage 4: Dynamic Strength and Agility   []  Stage 5: Sport Specific Training     []  Ready to Return to Play, Meets All Above Stages   []  Not Ready for Return to Sports   Comments:                               PLAN: See eval  [] Continue per plan of care [] Alter current plan (see comments above)  [x] Plan of care initiated [] Hold pending MD visit [] Discharge  Note: If patient does not return for scheduled/ recommended follow up visits, this note will serve as a discharge from care along with most recent update on progress. Reviewed insurance benefits for physical therapy in an outpatient hospital based setting with the patient, including deductible  and allowable visit number.  Pt was informed of possible out of pocket costs, as well as, informed of other service options for continuing supervised sessions without required skilled PT intervention such as the cash based [employer] program.     Electronically signed by:  Lisa Wright, PT, DPT, Cert DN

## 2022-02-04 ENCOUNTER — HOSPITAL ENCOUNTER (OUTPATIENT)
Dept: PHYSICAL THERAPY | Age: 47
Setting detail: THERAPIES SERIES
End: 2022-02-04
Payer: COMMERCIAL

## 2022-02-11 ENCOUNTER — HOSPITAL ENCOUNTER (OUTPATIENT)
Dept: PHYSICAL THERAPY | Age: 47
Setting detail: THERAPIES SERIES
Discharge: HOME | End: 2022-02-11
Payer: COMMERCIAL

## 2022-02-11 PROCEDURE — 97530 THERAPEUTIC ACTIVITIES: CPT

## 2022-02-11 PROCEDURE — 97110 THERAPEUTIC EXERCISES: CPT

## 2022-02-11 NOTE — FLOWSHEET NOTE
[facility], [place]      Physical Therapy Daily Treatment Note  Date:  2022    Patient Name:  Aislinn Pérez    :  1975  MRN: [id number]  Medical/Treatment Diagnosis Information:  · Diagnosis: S46.011A Right Rotator cuff tear  · Treatment Diagnosis: S46.011A Right Rotator cuff tear  Insurance/Certification information:  PT Insurance Information: [facility]  Physician Information:  Referring Practitioner: Keagan Briones  Has the plan of care been signed (Y/N):        []  Yes  [x]  No     Date of Patient follow up with Physician: 22      Is this a Progress Report:     []  Yes  [x]  No        Progress report will be due (10 Rx or 30 days whichever is less):        Recertification will be due (POC Duration  / 90 days whichever is less):          Visit # Insurance Allowable Auth Required   2 18 12/3/21-22 []  Yes []  No        Functional Scale: UEFI 100%     Date assessed:  22      Latex Allergy:  [x]NO      []YES  Preferred Language for Healthcare:   [x]English       []other:    Pain level:  4-5/10     SUBJECTIVE:  Pt is 2.5 week sp. Pt notes that he has been trying to not wear his sling because he has been trying to \"get better faster\". He notes that he has been moving around with the shoulder .  He notes that the last few nights with sleeping have been painful but he has not sleeping in the sling     OBJECTIVE:   Observation:    Test measurements:      ROM PROM AROM  Comment    L R  22 L R    Flexion  100 table slides       Abduction        ER  30 seated cane       IR        Other        Other             Strength L R Comment   Flexion      Abduction      ER      IR      Supraspinatus      Upper Trap      Lower Trap      Mid Trap      Rhomboids      Biceps      Triceps      Horizontal Abduction      Horizontal Adduction      Lats          RESTRICTIONS/PRECAUTIONS: sp R RTC repair w/biceps tenodesis and subacromial decomp; see Letters Exercises:  Exercise/Equipment Resistance Repetitions Other comments   Stretching/PROM      Wand  ER seated 10x10     Table Slides  Flexion 10x10    UE [place]      Pulleys      Pendulum       PROM Elbow  10x10    Isometrics      Retraction            Weight shift      Flexion      Abduction      External Rotation      Internal Rotation      Biceps      Triceps            PRE's      Flexion      Abduction      External Rotation      Internal Rotation      Shrugs  3x10    EXT      Reverse Flys      Serratus      Horizontal Abd with ER      Biceps      Triceps      Retraction  3x10          Cable Column/Theraband      External Rotation      Internal Rotation      Shrugs      Lats      Ext      Flex      Scapular Retraction      BIC      TRIC      PNF            Dynamic Stability            [place]                Therapeutic Exercise and NMR EXR  [] (76708) Provided verbal/tactile cueing for activities related to strengthening, flexibility, endurance, ROM  for improvements in scapular, scapulothoracic and UE control with self care, reaching, carrying, lifting, house/yardwork, driving/computer work.    [] (94319) Provided verbal/tactile cueing for activities related to improving balance, coordination, kinesthetic sense, posture, motor skill, proprioception  to assist with  scapular, scapulothoracic and UE control with self care, reaching, carrying, lifting, house/yardwork, driving/computer work. Therapeutic Activities:    [] (55630 or 45462) Provided verbal/tactile cueing for activities related to improving balance, coordination, kinesthetic sense, posture, motor skill, proprioception and motor activation to allow for proper function of scapular, scapulothoracic and UE control with self care, carrying, lifting, driving/computer work.      Home Exercise Program:    [x] (94720) Reviewed/Progressed HEP activities related to strengthening, flexibility, endurance, ROM of scapular, scapulothoracic and UE control with self care, reaching, carrying, lifting, house/yardwork, driving/computer work  [] (08700) Reviewed/Progressed HEP activities related to improving balance, coordination, kinesthetic sense, posture, motor skill, proprioception of scapular, scapulothoracic and UE control with self care, reaching, carrying, lifting, house/yardwork, driving/computer work      Manual Treatments:  PROM / STM / Oscillations-Mobs:  G-I, II, III, IV (PA's, Inf., Post.)  [] ([id number]) Provided manual therapy to mobilize soft tissue/joints of cervical/CT, scapular GHJ and UE for the purpose of modulating pain, promoting relaxation,  increasing ROM, reducing/eliminating soft tissue swelling/inflammation/restriction, improving soft tissue extensibility and allowing for proper ROM for normal function with self care, reaching, carrying, lifting, house/yardwork, driving/computer work    Modalities:      Charges: [id number]  Timed Code Treatment Minutes: 25   Total Treatment Minutes: 25     [] EVAL (LOW) 00181   [] EVAL (MOD) 21484   [] EVAL (HIGH) 04764   [] RE-EVAL   [x] ZN(76439) x   1 (8642-2614, 15)  [] IONTO  [] NMR (68879) x     [] VASO  [] Manual (19744) x      [] Other:  [x] TA x  1 (0010-1102, 10)   [] Mech Traction (49299)  [] ES(attended) (50019)      [] ES (un) (84630):       HEP instruction:   Access Code: [email or web address]  URL: [email or web address]  Date: 02/11/2022  Prepared by: Michael Or    Exercises  Seated Shoulder Flexion Towel Slide at Table Top - 1 x daily - 7 x weekly - 1 sets - 10 reps - 10 hold  Supported Elbow Flexion Extension PROM - 1 x daily - 7 x weekly - 1 sets - 10 reps - 10 hold  Seated Shoulder External Rotation AAROM with Cane and Hand in Neutral - 1 x daily - 7 x weekly - 1 sets - 10 reps - 10 hold  Seated Shoulder Shrugs - 1 x daily - 7 x weekly - 3 sets - 10 reps  Seated Scapular Retraction - 1 x daily - 7 x weekly - 3 sets - 10 reps  Seated Gripping Towel - 1 x daily - 7 x weekly - 3 sets - 10 reps      GOALS:  Patient stated goal: return to work duties without pain    [] Progressing: [] Met: [] Not Met: [] Adjusted    Therapist goals for Patient:   Short Term Goals: To be achieved in: 2-6 weeks  1. Independent in HEP and progression per patient tolerance, in order to prevent re-injury. [] Progressing: [] Met: [] Not Met: [] Adjusted   2. Patient will have a decrease in pain to facilitate improvement in movement, function, and ADLs as indicated by Functional Deficits. [] Progressing: [] Met: [] Not Met: [] Adjusted   3. Patient will demonstrate increased AROM to 120+ flex, 60+ ERto allow for proper joint functioning as indicated by patients Functional Deficits. 6 weeks   [] Progressing: [] Met: [] Not Met: [] Adjusted  4. Patient will return to ADLs such as bathing/dressing/grooming without increased symptoms or restriction. 6 weeks  [] Progressing: [] Met: [] Not Met: [] Adjusted    Long Term Goals: To be achieved in: 12-16+ weeks  1. Disability index score of 20% or less for the UEFI to assist with reaching prior level of function. [] Progressing: [] Met: [] Not Met: [] Adjusted  2. Patient will demonstrate increased AROM to 150+ flex, 80+ ERto allow for proper joint functioning as indicated by patients Functional Deficits. [] Progressing: [] Met: [] Not Met: [] Adjusted  3. Patient will demonstrate an increase in Strength to 4/5 or greater to allow for proper functional mobility as indicated by patients Functional Deficits. [] Progressing: [] Met: [] Not Met: [] Adjusted  4. Patient will return to reaching/lifting/carrying without increased symptoms or restriction. [] Progressing: [] Met: [] Not Met: [] Adjusted  5. Patient will be able to lift/carry greater than 25lbs without increased symptoms or restriction in order to return to work duties.    [] Progressing: [] Met: [] Not Met: [] Adjusted    Overall Progression Towards Functional goals/ Treatment Progress Update:  [] Patient is progressing as expected towards functional goals listed. [] Progression is slowed due to complexities/Impairments listed. [] Progression has been slowed due to co-morbidities. [x] Plan just implemented, too soon to assess goals progression <30days   [] Goals require adjustment due to lack of progress  [] Patient is not progressing as expected and requires additional follow up with physician  [] Other    Prognosis for POC: [x] Good [] Fair  [] Poor      Patient requires continued skilled intervention: [x] Yes  [] No    Treatment/Activity Tolerance:  [x] Patient able to complete treatment  [] Patient limited by fatigue  [] Patient limited by pain     [] Patient limited by other medical complications  [] Other: Reiterated importance of use of sling when not bathing/dressing/performing HEP in order to allow for proper healing as well as no Active motion of shoulder as much as possible, pt verbalized understanding. Pt shows appropriate ROM for stage of rehab. Pt to see MD next week, continue with POC as advised. Return to Play: (if applicable)   []  Stage 1: Intro to Strength   []  Stage 2: Return to Run and Strength   []  Stage 3: Return to Jump and Strength   []  Stage 4: Dynamic Strength and Agility   []  Stage 5: Sport Specific Training     []  Ready to Return to Play, Meets All Above Stages   []  Not Ready for Return to Sports   Comments:                               PLAN: See eval  [] Continue per plan of care [] Alter current plan (see comments above)  [x] Plan of care initiated [] Hold pending MD visit [] Discharge  Note: If patient does not return for scheduled/ recommended follow up visits, this note will serve as a discharge from care along with most recent update on progress. Reviewed insurance benefits for physical therapy in an outpatient hospital based setting with the patient, including deductible  and allowable visit number.  Pt was informed of possible out of pocket costs, as well as, informed of other service options for continuing supervised sessions without required skilled PT intervention such as the cash based [employer] program.     Electronically signed by:  Sammi Wahl, PT, DPT, Cert DN

## 2022-02-16 ENCOUNTER — OFFICE VISIT (OUTPATIENT)
Dept: ORTHOPEDIC SURGERY | Age: 47
End: 2022-02-16

## 2022-02-16 VITALS — HEIGHT: 75 IN | WEIGHT: 262 LBS | BODY MASS INDEX: 32.58 KG/M2

## 2022-02-16 DIAGNOSIS — Z98.890 S/P RIGHT ROTATOR CUFF REPAIR: Primary | ICD-10-CM

## 2022-02-16 RX ORDER — MELOXICAM 15 MG/1
15 TABLET ORAL DAILY PRN
Qty: 30 TABLET | Refills: 2 | Status: SHIPPED | OUTPATIENT
Start: 2022-02-16 | End: 2022-02-18

## 2022-02-16 NOTE — LETTER
Shoulder Elbow Rehabilitation Referral    Patient Name: Elissa Garcia      YOB: 1975    Diagnosis:   1. S/P right rotator cuff repair        Precautions: RTC/Biceps    Post Op Instructions:  [] Continuous passive motion (CPM)  [x] Active Elbow range of motion  [] Exercise in plane of scapula   []  Strengthening     [] Pulley and instruction    [x] Home exercise program (copy to patient)   [x] Sling when arm at risk  [] Sling or brace at all times   [x] AAROM: Forward elevation to 90            [x] AAROM: External rotation to 20    [] Isometric external rotator strengthening [] AAROM: internal rotation: up the back  [x] Isometric abductor strengthening  [] AAROM: Internal abduction     [] Isometric internal rotator strengthening [] AAROM: cross-body adduction             Stretching:     Strengthening:  [] Four quadrant (FE, ER, IR, CBA)  [] Rotator cuff (ER, IR, Abd)  [] Forward Elevation    [] External Rotators     [] External Rotation    [] Internal Rotators  [] Internal Rotation: up/back   [] Abductors     [] Internal Rotation: supine in abduction  [] Flexors  [] Cross-body abduction    [] Extensors  [x] Pendulum (FE, Abd/Add, cw/ccw)  [x] Scapular Stabilizers   [] Wall-walking (FE, Abd)    [x] Shoulder shrugs     [] Table slides      [x] Rhomboid pinch  [] Elbow (flex, ext, pron, sup)    [] Lat.  Pull downs     [] Medial epicondylitis program    [] Forward punch   [] Lateral epicondylitis program    [] Internal rotators     [] Progressive resistive exercises  [] Bench Press        [] Bench press plus  Activities:     [] Lateral pull-downs  [] Rowing     [] Progressive two-hand supine press  [] Stepper/Exercise bike   [] Biceps: curls/supination  [] Swimming  [] Water exercises    Modalities: PRN    Return to Sport:  [] Ultrasound     [] Plyometrics  [] Iontophoresis     [] Rhythmic stabilization  [] Moist heat     [] Core strengthening   [] Massage     [] Sports specific program:   [x] Cryotherapy      [] Electrical stimulation     [] Paraffin  [] Whirlpool  [] TENS    [x] Home exercise program (copy to patient). Perform exercises for:   15     minutes    2-3      times/day  [x] Supervised physical therapy  Frequency: []  1x week  [x] 2x week  [] 3x week  [] Other:   Duration: [] 2 weeks   [] 4 weeks  [x] 6 weeks  [] Other:     Additional Instructions:   May progress Forward Elevation by 10 degrees weekly up to 140 and External Rotation 5 degrees weekly up to 40         Ike Evans MD, PhD

## 2022-02-16 NOTE — PROGRESS NOTES
History of Present Illness:  Stephy Beasley is a pleasant 42 y.o. male who presents for a post operative visit. He is 3 weeks out following a right shoulder arthroscopic subacromial decompression, rotator cuff repair, open subpectoral biceps tenodesis on 1/24/22. Overall He is doing okay and feels that their pain is well controlled with current pain medications. He has been compliant with wearing the UltraSling brace at all times. He has continued in physical therapy at the [facility]. He does have pain which interrupts his sleep although this is improving. He denies fevers, chills, numbness, tingling, and shortness of breath. He does complain of ringing in his bilateral ears. He first noticed this the day of surgery. He also complains of burning and cold sensations into his shoulder and arm. Medical History:  Patient's medications, allergies, past medical, surgical, social and family histories were reviewed and updated as appropriate. Review of Systems  A 14 point review of systems was completed by the patient on 10/12/21 and is available in the media section of the scanned medical record and was reviewed on 2/16/2022. Vital Signs: There were no vitals filed for this visit. General/Appearance: Alert and oriented and in no apparent distress. Skin:  There are no skin lesions, cellulitis, or extreme edema. The patient has warm and well-perfused Bilateral upper extremities with brisk capillary refill. Right Shoulder Exam:    Inspection: Shoulder incision portals are clean, dry and intact and well approximated. Mild ecchymosis and swelling are present as can be expected. There is no erythema, drainage or other signs of infection    Palpation:  No crepitus to gentle motion    Active Assisted Range of Motion: Forward Elevation 80 with soft end feel    Passive Range of Motion:  Deferred    Strength:  Deferred    Special Tests:  Deferred.     Neurovascular: Sensation to light touch is intact, no motor deficits, palpable radial pulses 2+    Radiology:     No new XR obtained at this time. Assessment :  Mr. Bree Leiva is a pleasant 42 y.o. patient who is now 3 weeks out following a right shoulder arthroscopic subacromial decompression, rotator cuff repair, open subpectoral biceps tenodesis on 1/24/22. Impression:  Encounter Diagnosis   Name Primary?  S/P right rotator cuff repair Yes       Office Procedures:  Orders Placed This Encounter   Procedures   [address] ([facility]     Referral Priority:   Routine     Referral Type:   Eval and Treat     Referral Reason:   Specialty Services Required     Requested Specialty:   Physical Therapy     Number of Visits Requested:   1       Treatment Plan:    Overall Bree Leiva is doing well. He may begin to wean out of the Ultrasling brace when at home, but should continue to wear it when at risk. He may begin to drive but must remove the sling when doing so. Additionally, He must be off of pain medications during the day when driving. We recommend He continue in physical therapy. We will progress his therapy at this time. We do feel he will benefit from an anti-inflammatory. We will call meloxicam in to his pharmacy. With respect to the tinnitus, he should follow up with his PCP regarding this issue. All of his questions were fully answered today. We would like to see Bree Leiva back in 3 weeks for follow-up visit. 2/16/2022  12:35 PM    [place] , [facility]  [facility] [address]    During this examination, IKendrick, functioned as a scribe for Dr. Katerina Barrientos. The history taking and physical examination were performed by Dr. Forest Humphrey. All counseling during the appointment was performed between the patient and Dr. Forest Humphrey.  2/16/2022  _________________  I, Dr. Katerina Barrientos, personally performed the services described in this documentation as described by Karen Tracy ATC in my presence, and it is both accurate and complete. Ike Engle MD, PhD  2/16/2022

## 2022-02-18 ENCOUNTER — HOSPITAL ENCOUNTER (OUTPATIENT)
Dept: PHYSICAL THERAPY | Age: 47
Setting detail: THERAPIES SERIES
Discharge: HOME | End: 2022-02-18
Payer: COMMERCIAL

## 2022-02-18 PROCEDURE — 97530 THERAPEUTIC ACTIVITIES: CPT

## 2022-02-18 PROCEDURE — 97110 THERAPEUTIC EXERCISES: CPT

## 2022-02-18 NOTE — FLOWSHEET NOTE
[facility], [place]      Physical Therapy Daily Treatment Note  Date:  2022    Patient Name:  Robbin Ayers    :  1975  MRN: [id number]  Medical/Treatment Diagnosis Information:  · Diagnosis: S46.011A Right Rotator cuff tear; sp RTC repair on 22  · Treatment Diagnosis: M11.297K Right Rotator cuff tear  Insurance/Certification information:  PT Insurance Information: [id number] [facility]  Physician Information:  Referring Practitioner: Donna Corona  Has the plan of care been signed (Y/N):        []  Yes  [x]  No     Date of Patient follow up with Physician: 3/09/22      Is this a Progress Report:     []  Yes  [x]  No        Progress report will be due (10 Rx or 30 days whichever is less):        Recertification will be due (POC Duration  / 90 days whichever is less):          Visit # Insurance Allowable Auth Required   3 18 12/3/21-4/15/22 []  Yes []  No        Functional Scale: UEFI 100%     Date assessed:  22      Latex Allergy:  [x]NO      []YES  Preferred Language for Healthcare:   [x]English       []other:    Pain level:  3-4/10     SUBJECTIVE:  Pt is 3.5 week sp. Pt saw MD who notes he is doing well and can dc the sling when around the house. He notes he has felt better the last couple of days out of the sling. Is still wearing it outside of the house. He was also prescribed meloxicam but hasnt started it today. He was also prescribed a medrol dosepak by his PCP today for Tinnitus.      OBJECTIVE:   Observation:    Test measurements:      ROM PROM AROM  Comment    L R  22 L R    Flexion  98 table slides       Abduction        ER  32 seated cane       IR  Hand to mid R buttock cane behind back       Other        Other             Strength L R Comment   Flexion      Abduction      ER      IR      Supraspinatus      Upper Trap      Lower Trap      Mid Trap      Rhomboids      Biceps      Triceps      Horizontal Abduction      Horizontal Adduction      Lats RESTRICTIONS/PRECAUTIONS: sp R RTC repair w/biceps tenodesis and subacromial decomp; see Letters 2/16; May progress Forward Elevation by 10 degrees weekly up to 140 and External Rotation 5 degrees weekly up to 40      Exercises:  Exercise/Equipment Resistance Repetitions Other comments   Stretching/PROM      Wand  ER seated 10x10     Table Slides  Flexion, scaption 10x10    IR behind back   10x10 cane           Pendulum       PROM Elbow  10x10    Isometrics      Retraction            Weight shift      Flexion      Abduction      External Rotation      Internal Rotation      Biceps      Triceps            PRE's      Flexion      Abduction      External Rotation      Internal Rotation      Shrugs  3x10    EXT      Reverse Flys      Serratus      Horizontal Abd with ER      Biceps      Triceps      Retraction  3x10          Cable Column/Theraband      External Rotation      Internal Rotation      Shrugs      Lats      Ext      Flex      Scapular Retraction      BIC      TRIC      PNF            Dynamic Stability            Plyoback                Therapeutic Exercise and NMR EXR  [] (97619) Provided verbal/tactile cueing for activities related to strengthening, flexibility, endurance, ROM  for improvements in scapular, scapulothoracic and UE control with self care, reaching, carrying, lifting, house/yardwork, driving/computer work.    [] (25500) Provided verbal/tactile cueing for activities related to improving balance, coordination, kinesthetic sense, posture, motor skill, proprioception  to assist with  scapular, scapulothoracic and UE control with self care, reaching, carrying, lifting, house/yardwork, driving/computer work.     Therapeutic Activities:    [] (97937 or 29280) Provided verbal/tactile cueing for activities related to improving balance, coordination, kinesthetic sense, posture, motor skill, proprioception and motor activation to allow for proper function of scapular, scapulothoracic and UE control with self care, carrying, lifting, driving/computer work. Home Exercise Program:    [x] (56432) Reviewed/Progressed HEP activities related to strengthening, flexibility, endurance, ROM of scapular, scapulothoracic and UE control with self care, reaching, carrying, lifting, house/yardwork, driving/computer work  [] (05822) Reviewed/Progressed HEP activities related to improving balance, coordination, kinesthetic sense, posture, motor skill, proprioception of scapular, scapulothoracic and UE control with self care, reaching, carrying, lifting, house/yardwork, driving/computer work      Manual Treatments:  PROM / STM / Oscillations-Mobs:  G-I, II, III, IV (PA's, Inf., Post.)  [] (74677) Provided manual therapy to mobilize soft tissue/joints of cervical/CT, scapular GHJ and UE for the purpose of modulating pain, promoting relaxation,  increasing ROM, reducing/eliminating soft tissue swelling/inflammation/restriction, improving soft tissue extensibility and allowing for proper ROM for normal function with self care, reaching, carrying, lifting, house/yardwork, driving/computer work    Modalities:      Charges: 1400-1264  Timed Code Treatment Minutes: 35   Total Treatment Minutes: 35     [] EVAL (LOW) 45305   [] EVAL (MOD) 59095   [] EVAL (HIGH) 95196   [] RE-EVAL   [x] IY(26322) x  2 (4256-4343, 25)  [] IONTO  [] NMR (38193) x     [] VASO  [] Manual (87321) x      [] Other:  [x] TA x  1 (6111-0419, 10)   [] Mech Traction (61350)  [] ES(attended) (20600)      [] ES (un) (27986):       HEP instruction:   Access Code: [email or web address]  URL: [email or web address]  Date: 02/18/2022  Prepared by: Johnny Nolan     Exercises  Seated Shoulder Flexion Towel Slide at Table Top - 1 x daily - 7 x weekly - 1 sets - 10 reps - 10 hold  Seated Shoulder Scaption Slide at Table Top with Forearm in Neutral - 1 x daily - 7 x weekly - 1 sets - 10 reps - 10 hold  Standing Shoulder Internal Rotation AAROM Behind Back with Towel - to allow for proper functional mobility as indicated by patients Functional Deficits. [] Progressing: [] Met: [] Not Met: [] Adjusted  4. Patient will return to reaching/lifting/carrying without increased symptoms or restriction. [] Progressing: [] Met: [] Not Met: [] Adjusted  5. Patient will be able to lift/carry greater than 25lbs without increased symptoms or restriction in order to return to work duties. [] Progressing: [] Met: [] Not Met: [] Adjusted    Overall Progression Towards Functional goals/ Treatment Progress Update:  [] Patient is progressing as expected towards functional goals listed. [] Progression is slowed due to complexities/Impairments listed. [] Progression has been slowed due to co-morbidities. [x] Plan just implemented, too soon to assess goals progression <30days   [] Goals require adjustment due to lack of progress  [] Patient is not progressing as expected and requires additional follow up with physician  [] Other    Prognosis for POC: [x] Good [] Fair  [] Poor      Patient requires continued skilled intervention: [x] Yes  [] No    Treatment/Activity Tolerance:  [x] Patient able to complete treatment  [] Patient limited by fatigue  [] Patient limited by pain     [] Patient limited by other medical complications  [] Other: pt able to progress ROM today but does note increased discomfort. We discussed that he should not be taking meloxicam concurrently with the medrol dosepak prescribed by a different MD but that he also may want to call Dr. Compa Morley office to see if he should begin the meloxicam or the dosepak d/t tissue healing this time. Continue to progress as tolerated.           Return to Play: (if applicable)   []  Stage 1: Intro to Strength   []  Stage 2: Return to Run and Strength   []  Stage 3: Return to Jump and Strength   []  Stage 4: Dynamic Strength and Agility   []  Stage 5: Sport Specific Training     []  Ready to Return to Play, Meets All Above Stages   []  Not Ready for Return to Sports   Comments:                               PLAN: See eval  [] Continue per plan of care [] Alter current plan (see comments above)  [x] Plan of care initiated [] Hold pending MD visit [] Discharge  Note: If patient does not return for scheduled/ recommended follow up visits, this note will serve as a discharge from care along with most recent update on progress. Reviewed insurance benefits for physical therapy in an outpatient hospital based setting with the patient, including deductible  and allowable visit number.  Pt was informed of possible out of pocket costs, as well as, informed of other service options for continuing supervised sessions without required skilled PT intervention such as the [facility] program.     Electronically signed by:  Bishnu Olivo, PT, DPT, Cert DN

## 2022-02-23 ENCOUNTER — HOSPITAL ENCOUNTER (OUTPATIENT)
Dept: PHYSICAL THERAPY | Age: 47
Setting detail: THERAPIES SERIES
Discharge: HOME | End: 2022-02-23
Payer: COMMERCIAL

## 2022-02-23 PROCEDURE — 97530 THERAPEUTIC ACTIVITIES: CPT

## 2022-02-23 PROCEDURE — 97110 THERAPEUTIC EXERCISES: CPT

## 2022-02-23 NOTE — FLOWSHEET NOTE
[facility], [place]      Physical Therapy Daily Treatment Note  Date:  2022    Patient Name:  Mansoor Weeks    :  1975  MRN: [id number]  Medical/Treatment Diagnosis Information:  · Diagnosis: S46.011A Right Rotator cuff tear; sp RTC repair on 22  · Treatment Diagnosis: S50.418B Right Rotator cuff tear  Insurance/Certification information:  PT Insurance Information: [id number] [facility]  Physician Information:  Referring Practitioner: Claudia Bill  Has the plan of care been signed (Y/N):        []  Yes  [x]  No     Date of Patient follow up with Physician: 3/09/22      Is this a Progress Report:     []  Yes  [x]  No        Progress report will be due (10 Rx or 30 days whichever is less): 89       Recertification will be due (POC Duration  / 90 days whichever is less):          Visit # Insurance Allowable Auth Required   4 18 12/3/21-4/15/22 []  Yes []  No        Functional Scale: UEFI 100%     Date assessed:  22      Latex Allergy:  [x]NO      []YES  Preferred Language for Healthcare:   [x]English       []other:    Pain level:  0/10     SUBJECTIVE:  Pt is 4 week sp. Pt notes that he is started medrol laurie prescribed by PCP for ringing in ears which isnt helping much but his shoulder feels good.      OBJECTIVE:   Observation:    Test measurements:      ROM PROM AROM  Comment    L R  22 L R    Flexion  126 pulleys       Abduction  108 pulleys scaption       ER  32 seated cane       IR  Hand to mid R buttock cane behind back       Other        Other             Strength L R Comment   Flexion      Abduction      ER      IR      Supraspinatus      Upper Trap      Lower Trap      Mid Trap      Rhomboids      Biceps      Triceps      Horizontal Abduction      Horizontal Adduction      Lats          RESTRICTIONS/PRECAUTIONS: sp R RTC repair w/biceps tenodesis and subacromial decomp; see Letters 2/16; May progress Forward Elevation by 10 degrees weekly up to 140 and External Rotation 5 degrees weekly up to 40      Exercises:  Exercise/Equipment Resistance Repetitions Other comments   Stretching/PROM      Wand  ER supine 10x10     Table Slides  Flexion, scaption 10x10    IR behind back   10x10 cane           Pendulum       PROM Elbow  10x10    Isometrics      Retraction            Weight shift      Flexion      Abduction      External Rotation      Internal Rotation      Biceps      Triceps            PRE's      Flexion      Abduction      External Rotation      Internal Rotation      Shrugs  3x10    EXT      Reverse Flys      Serratus      Horizontal Abd with ER      Biceps      Triceps      Retraction  3x10    UK deltoid  3 min mini painfree arc     Cable Column/Theraband      External Rotation      Internal Rotation      Shrugs      Lats      Ext      Flex      Scapular Retraction  3x10 blue     BIC      TRIC      PNF            Dynamic Stability            Plyoback                Therapeutic Exercise and NMR EXR  [] (35367) Provided verbal/tactile cueing for activities related to strengthening, flexibility, endurance, ROM  for improvements in scapular, scapulothoracic and UE control with self care, reaching, carrying, lifting, house/yardwork, driving/computer work.    [] (33915) Provided verbal/tactile cueing for activities related to improving balance, coordination, kinesthetic sense, posture, motor skill, proprioception  to assist with  scapular, scapulothoracic and UE control with self care, reaching, carrying, lifting, house/yardwork, driving/computer work. Therapeutic Activities:    [] (55986 or 41144) Provided verbal/tactile cueing for activities related to improving balance, coordination, kinesthetic sense, posture, motor skill, proprioception and motor activation to allow for proper function of scapular, scapulothoracic and UE control with self care, carrying, lifting, driving/computer work.      Home Exercise Program:    [x] (18200) Reviewed/Progressed HEP activities related to strengthening, flexibility, endurance, ROM of scapular, scapulothoracic and UE control with self care, reaching, carrying, lifting, house/yardwork, driving/computer work  [] (67340) Reviewed/Progressed HEP activities related to improving balance, coordination, kinesthetic sense, posture, motor skill, proprioception of scapular, scapulothoracic and UE control with self care, reaching, carrying, lifting, house/yardwork, driving/computer work      Manual Treatments:  PROM / STM / Oscillations-Mobs:  G-I, II, III, IV (PA's, Inf., Post.)  [] (44556) Provided manual therapy to mobilize soft tissue/joints of cervical/CT, scapular GHJ and UE for the purpose of modulating pain, promoting relaxation,  increasing ROM, reducing/eliminating soft tissue swelling/inflammation/restriction, improving soft tissue extensibility and allowing for proper ROM for normal function with self care, reaching, carrying, lifting, house/yardwork, driving/computer work    Modalities:      Charges: 150-235  Timed Code Treatment Minutes: 45   Total Treatment Minutes: 45     [] EVAL (LOW) 94028   [] EVAL (MOD) 31024   [] EVAL (HIGH) 50269   [] RE-EVAL   [x] QS(85284) x  2 (150-220, 30)  [] IONTO  [] NMR (58717) x     [] VASO  [] Manual (52602) x      [] Other:  [x] TA x  1 (912-273, 15)   [] [facility] (45964)  [] ES(attended) (75112)      [] ES (un) (11166):       HEP instruction:   Access Code: [email or web address]  URL: [email or web address]  Date: 02/18/2022  Prepared by: Filiberto White     Exercises  Seated Shoulder Flexion Towel Slide at Table Top - 1 x daily - 7 x weekly - 1 sets - 10 reps - 10 hold  Seated Shoulder Scaption Slide at Table Top with Forearm in Neutral - 1 x daily - 7 x weekly - 1 sets - 10 reps - 10 hold  Standing Shoulder Internal Rotation AAROM Behind Back with Towel - 1 x daily - 7 x weekly - 1 sets - 10 reps - 10 hold  Supported Elbow Flexion Extension PROM - 1 x daily - 7 x weekly - 1 sets - 10 reps - 10 hold  Seated Shoulder External Rotation AAROM with Cane and Hand in Neutral - 1 x daily - 7 x weekly - 1 sets - 10 reps - 10 hold  Seated Shoulder Shrugs - 1 x daily - 7 x weekly - 3 sets - 10 reps  Seated Scapular Retraction - 1 x daily - 7 x weekly - 3 sets - 10 reps  Wrist Flexion Extension AROM - Palms Down - 1 x daily - 7 x weekly - 3 sets - 10 reps  Seated Gripping Towel - 1 x daily - 7 x weekly - 3 sets - 10 reps      GOALS:  Patient stated goal: return to work duties without pain    [] Progressing: [] Met: [] Not Met: [] Adjusted    Therapist goals for Patient:   Short Term Goals: To be achieved in: 2-6 weeks  1. Independent in HEP and progression per patient tolerance, in order to prevent re-injury. [] Progressing: [] Met: [] Not Met: [] Adjusted   2. Patient will have a decrease in pain to facilitate improvement in movement, function, and ADLs as indicated by Functional Deficits. [] Progressing: [] Met: [] Not Met: [] Adjusted   3. Patient will demonstrate increased AROM to 120+ flex, 60+ ERto allow for proper joint functioning as indicated by patients Functional Deficits. 6 weeks   [] Progressing: [] Met: [] Not Met: [] Adjusted  4. Patient will return to ADLs such as bathing/dressing/grooming without increased symptoms or restriction. 6 weeks  [] Progressing: [] Met: [] Not Met: [] Adjusted    Long Term Goals: To be achieved in: 12-16+ weeks  1. Disability index score of 20% or less for the UEFI to assist with reaching prior level of function. [] Progressing: [] Met: [] Not Met: [] Adjusted  2. Patient will demonstrate increased AROM to 150+ flex, 80+ ERto allow for proper joint functioning as indicated by patients Functional Deficits. [] Progressing: [] Met: [] Not Met: [] Adjusted  3. Patient will demonstrate an increase in Strength to 4/5 or greater to allow for proper functional mobility as indicated by patients Functional Deficits. [] Progressing: [] Met: [] Not Met: [] Adjusted  4. discharge from care along with most recent update on progress. Reviewed insurance benefits for physical therapy in an outpatient hospital based setting with the patient, including deductible  and allowable visit number.  Pt was informed of possible out of pocket costs, as well as, informed of other service options for continuing supervised sessions without required skilled PT intervention such as the cash based [employer] program.     Electronically signed by:  Alexandra Mclean, PT, DPT, Cert DN

## 2022-02-25 ENCOUNTER — HOSPITAL ENCOUNTER (OUTPATIENT)
Dept: PHYSICAL THERAPY | Age: 47
Setting detail: THERAPIES SERIES
Discharge: HOME | End: 2022-02-25
Payer: COMMERCIAL

## 2022-02-25 PROCEDURE — 97140 MANUAL THERAPY 1/> REGIONS: CPT

## 2022-02-25 PROCEDURE — 97530 THERAPEUTIC ACTIVITIES: CPT

## 2022-02-25 PROCEDURE — 97110 THERAPEUTIC EXERCISES: CPT

## 2022-02-25 NOTE — FLOWSHEET NOTE
Repetitions Other comments   Stretching/PROM      Wand  ER supine 10x10     Table Slides  Flexion, scaption 10x10      IR behind back   10x10 cane           Pendulum       PROM Elbow  10x10    Isometrics      Retraction            Weight shift      Flexion      Abduction      External Rotation      Internal Rotation      Biceps      Triceps            PRE's      Flexion      Abduction      External Rotation      Internal Rotation      Shrugs  3x10    EXT      Reverse Flys      Serratus  2x10     Horizontal Abd with ER      Biceps      Triceps      Retraction      UK deltoid  3 min mini painfree arc     Cable Column/Theraband      External Rotation      Internal Rotation      Shrugs      Lats      Ext      Flex      Scapular Retraction  3x10 blue     BIC      TRIC      PNF            Dynamic Stability            Ply[place]ack                Therapeutic Exercise and NMR EXR  [] (67651) Provided verbal/tactile cueing for activities related to strengthening, flexibility, endurance, ROM  for improvements in scapular, scapulothoracic and UE control with self care, reaching, carrying, lifting, house/yardwork, driving/computer work.    [] (40627) Provided verbal/tactile cueing for activities related to improving balance, coordination, kinesthetic sense, posture, motor skill, proprioception  to assist with  scapular, scapulothoracic and UE control with self care, reaching, carrying, lifting, house/yardwork, driving/computer work. Therapeutic Activities:    [] (43271 or 51710) Provided verbal/tactile cueing for activities related to improving balance, coordination, kinesthetic sense, posture, motor skill, proprioception and motor activation to allow for proper function of scapular, scapulothoracic and UE control with self care, carrying, lifting, driving/computer work.      Home Exercise Program:    [x] (63150) Reviewed/Progressed HEP activities related to strengthening, flexibility, endurance, ROM of scapular, scapulothoracic and UE control with self care, reaching, carrying, lifting, house/yardwork, driving/computer work  [] (03911) Reviewed/Progressed HEP activities related to improving balance, coordination, kinesthetic sense, posture, motor skill, proprioception of scapular, scapulothoracic and UE control with self care, reaching, carrying, lifting, house/yardwork, driving/computer work      Manual Treatments:  PROM / STM / Oscillations-Mobs:  G-I, II, III, IV (PA's, Inf., Post.)  [] ([id number]) Provided manual therapy to mobilize soft tissue/joints of cervical/CT, scapular GHJ and UE for the purpose of modulating pain, promoting relaxation,  increasing ROM, reducing/eliminating soft tissue swelling/inflammation/restriction, improving soft tissue extensibility and allowing for proper ROM for normal function with self care, reaching, carrying, lifting, house/yardwork, driving/computer work    PROM ER 10'   Modalities:      Charges: 200-245  Timed Code Treatment Minutes: 45   Total Treatment Minutes: 45     [] EVAL (LOW) 94703   [] EVAL (MOD) 81092   [] EVAL (HIGH) 43892   [] RE-EVAL   [x] YY(92399) x  2 (200-225, 25)  [] IONTO  [] NMR (14211) x     [] VASO  [x] Manual (48451) x 1 (386-083, 10)    [] Other:  [x] TA x  1 (235-245, 10)   [] Mech Traction (38548)  [] ES(attended) (58521)      [] ES (un) (66556):       HEP instruction:   Access Code: [email or web address]  URL: [email or web address]  Date: 02/18/2022  Prepared by: Jin Burciaga     Exercises  Seated Shoulder Flexion Towel Slide at Table Top - 1 x daily - 7 x weekly - 1 sets - 10 reps - 10 hold  Seated Shoulder Scaption Slide at Table Top with Forearm in Neutral - 1 x daily - 7 x weekly - 1 sets - 10 reps - 10 hold  Standing Shoulder Internal Rotation AAROM Behind Back with Towel - 1 x daily - 7 x weekly - 1 sets - 10 reps - 10 hold  Supported Elbow Flexion Extension PROM - 1 x daily - 7 x weekly - 1 sets - 10 reps - 10 hold  Seated Shoulder External Rotation AAROM with Cane and Hand in Neutral - 1 x daily - 7 x weekly - 1 sets - 10 reps - 10 hold  Seated Shoulder Shrugs - 1 x daily - 7 x weekly - 3 sets - 10 reps  Seated Scapular Retraction - 1 x daily - 7 x weekly - 3 sets - 10 reps  Wrist Flexion Extension AROM - Palms Down - 1 x daily - 7 x weekly - 3 sets - 10 reps  Seated Gripping Towel - 1 x daily - 7 x weekly - 3 sets - 10 reps      GOALS:  Patient stated goal: return to work duties without pain    [] Progressing: [] Met: [] Not Met: [] Adjusted    Therapist goals for Patient:   Short Term Goals: To be achieved in: 2-6 weeks  1. Independent in HEP and progression per patient tolerance, in order to prevent re-injury. [] Progressing: [] Met: [] Not Met: [] Adjusted   2. Patient will have a decrease in pain to facilitate improvement in movement, function, and ADLs as indicated by Functional Deficits. [] Progressing: [] Met: [] Not Met: [] Adjusted   3. Patient will demonstrate increased AROM to 120+ flex, 60+ ERto allow for proper joint functioning as indicated by patients Functional Deficits. 6 weeks   [] Progressing: [] Met: [] Not Met: [] Adjusted  4. Patient will return to ADLs such as bathing/dressing/grooming without increased symptoms or restriction. 6 weeks  [] Progressing: [] Met: [] Not Met: [] Adjusted    Long Term Goals: To be achieved in: 12-16+ weeks  1. Disability index score of 20% or less for the UEFI to assist with reaching prior level of function. [] Progressing: [] Met: [] Not Met: [] Adjusted  2. Patient will demonstrate increased AROM to 150+ flex, 80+ ERto allow for proper joint functioning as indicated by patients Functional Deficits. [] Progressing: [] Met: [] Not Met: [] Adjusted  3. Patient will demonstrate an increase in Strength to 4/5 or greater to allow for proper functional mobility as indicated by patients Functional Deficits. [] Progressing: [] Met: [] Not Met: [] Adjusted  4.  Patient will return to reaching/lifting/carrying without increased symptoms or restriction. [] Progressing: [] Met: [] Not Met: [] Adjusted  5. Patient will be able to lift/carry greater than 25lbs without increased symptoms or restriction in order to return to work duties. [] Progressing: [] Met: [] Not Met: [] Adjusted    Overall Progression Towards Functional goals/ Treatment Progress Update:  [] Patient is progressing as expected towards functional goals listed. [] Progression is slowed due to complexities/Impairments listed. [] Progression has been slowed due to co-morbidities. [x] Plan just implemented, too soon to assess goals progression <30days   [] Goals require adjustment due to lack of progress  [] Patient is not progressing as expected and requires additional follow up with physician  [] Other    Prognosis for POC: [x] Good [] Fair  [] Poor      Patient requires continued skilled intervention: [x] Yes  [] No    Treatment/Activity Tolerance:  [x] Patient able to complete treatment  [] Patient limited by fatigue  [] Patient limited by pain     [] Patient limited by other medical complications  [] Other: pt shows improving ROM into flex, scap with pulleys but still is significantly limited into ER. With PT OP into PROM he is able to achieve roughly 10 more degrees than with self stretching. We did discuss emphasis on ER stretching up to 2x per day to help progress. Continue to progress as tolerated with ROM.      Return to Play: (if applicable)   []  Stage 1: Intro to Strength   []  Stage 2: Return to Run and Strength   []  Stage 3: Return to Jump and Strength   []  Stage 4: Dynamic Strength and Agility   []  Stage 5: Sport Specific Training     []  Ready to Return to Play, Meets All Above Stages   []  Not Ready for Return to Sports   Comments:                               PLAN: See eval  [] Continue per plan of care [] Alter current plan (see comments above)  [x] Plan of care initiated [] Hold pending MD visit [] Discharge  Note: If patient does not return for scheduled/ recommended follow up visits, this note will serve as a discharge from care along with most recent update on progress. Reviewed insurance benefits for physical therapy in an outpatient hospital based setting with the patient, including deductible  and allowable visit number.  Pt was informed of possible out of pocket costs, as well as, informed of other service options for continuing supervised sessions without required skilled PT intervention such as the cash based [employer] program.     Electronically signed by:  Filiberto White, PT, DPT, Cert DN

## 2022-03-02 ENCOUNTER — HOSPITAL ENCOUNTER (OUTPATIENT)
Dept: PHYSICAL THERAPY | Age: 47
Setting detail: THERAPIES SERIES
Discharge: HOME | End: 2022-03-02
Payer: COMMERCIAL

## 2022-03-02 PROCEDURE — 97140 MANUAL THERAPY 1/> REGIONS: CPT

## 2022-03-02 PROCEDURE — 97110 THERAPEUTIC EXERCISES: CPT

## 2022-03-02 NOTE — FLOWSHEET NOTE
[facility], [place]      Physical Therapy Daily Treatment Note  Date:  3/2/2022    Patient Name:  Cara Sanford    :  1975  MRN: [id number]  Medical/Treatment Diagnosis Information:  · Diagnosis: S46.011A Right Rotator cuff tear; sp RTC repair on 22  · Treatment Diagnosis: R78.317S Right Rotator cuff tear  Insurance/Certification information:  PT Insurance Information: [facility]  Physician Information:  Referring Practitioner: Fabián Joya  Has the plan of care been signed (Y/N):        []  Yes  [x]  No     Date of Patient follow up with Physician: 3/09/22      Is this a Progress Report:     []  Yes  [x]  No        Progress report will be due (10 Rx or 30 days whichever is less):        Recertification will be due (POC Duration  / 90 days whichever is less):          Visit # Insurance Allowable Auth Required   6 18 12/3/21-4/15/22 []  Yes []  No        Functional Scale: UEFI 100%     Date assessed:  22      Latex Allergy:  [x]NO      []YES  Preferred Language for Healthcare:   [x]English       []other:    Pain level:  0/10     SUBJECTIVE:  Pt is 5 week sp. Pt notes he is still sore from last session. He notes that he fell on  night to the floor missing the seat of his recliner, he feels like he fell more on his bottom but did outstretch both arms behind him in reflex to catch himself. He felt increased pain in shoulder but that has since dissipated.  He notes he has only done his HEP 1x since last session     OBJECTIVE:   Observation:    Test measurements:      ROM PROM AROM  Comment    L R  22 L R    Flexion  149 pulleys       Abduction  156 pulleys scaption       ER  32 supine  Cane; 35 PROM 45      IR  21 sleeper      Other        Other             Strength L R Comment   Flexion      Abduction      ER      IR      Supraspinatus      Upper Trap      Lower Trap      Mid Trap      Rhomboids      Biceps      Triceps      Horizontal Abduction      Horizontal proper function of scapular, scapulothoracic and UE control with self care, carrying, lifting, driving/computer work. Home Exercise Program:    [x] (99693) Reviewed/Progressed HEP activities related to strengthening, flexibility, endurance, ROM of scapular, scapulothoracic and UE control with self care, reaching, carrying, lifting, house/yardwork, driving/computer work  [] (11265) Reviewed/Progressed HEP activities related to improving balance, coordination, kinesthetic sense, posture, motor skill, proprioception of scapular, scapulothoracic and UE control with self care, reaching, carrying, lifting, house/yardwork, driving/computer work      Manual Treatments:  PROM / STM / Oscillations-Mobs:  G-I, II, III, IV (PA's, Inf., Post.)  [] (44416) Provided manual therapy to mobilize soft tissue/joints of cervical/CT, scapular GHJ and UE for the purpose of modulating pain, promoting relaxation,  increasing ROM, reducing/eliminating soft tissue swelling/inflammation/restriction, improving soft tissue extensibility and allowing for proper ROM for normal function with self care, reaching, carrying, lifting, house/yardwork, driving/computer work    PROM ER 10'   Modalities:      Charges: 208-245  Timed Code Treatment Minutes: 37   Total Treatment Minutes: 37     [] EVAL (LOW) 43250   [] EVAL (MOD) 22980   [] EVAL (HIGH) 98689   [] RE-EVAL   [x] UO(67694) x  2 (208-235, 27)  [] IONTO  [] NMR (54888) x     [] VASO  [x] Manual (33685) x 1 (235-245, 10)    [] Other:  [x] TA x     [] Mech Traction (35708)  [] ES(attended) (59620)      [] ES (un) (82220):       HEP instruction:   Access Code: [email or web address]  URL: [email or web address]  Date: 02/18/2022  Prepared by: Nisha Paez     Exercises  Seated Shoulder Flexion Towel Slide at Table Top - 1 x daily - 7 x weekly - 1 sets - 10 reps - 10 hold  Seated Shoulder Scaption Slide at Table Top with Forearm in Neutral - 1 x daily - 7 x weekly - 1 sets - 10 reps - 10 hold  Standing Shoulder Internal Rotation AAROM Behind Back with Towel - 1 x daily - 7 x weekly - 1 sets - 10 reps - 10 hold  Supported Elbow Flexion Extension PROM - 1 x daily - 7 x weekly - 1 sets - 10 reps - 10 hold  Seated Shoulder External Rotation AAROM with Cane and Hand in Neutral - 1 x daily - 7 x weekly - 1 sets - 10 reps - 10 hold  Seated Shoulder Shrugs - 1 x daily - 7 x weekly - 3 sets - 10 reps  Seated Scapular Retraction - 1 x daily - 7 x weekly - 3 sets - 10 reps  Wrist Flexion Extension AROM - Palms Down - 1 x daily - 7 x weekly - 3 sets - 10 reps  Seated Gripping Towel - 1 x daily - 7 x weekly - 3 sets - 10 reps      GOALS:  Patient stated goal: return to work duties without pain    [] Progressing: [] Met: [] Not Met: [] Adjusted    Therapist goals for Patient:   Short Term Goals: To be achieved in: 2-6 weeks  1. Independent in HEP and progression per patient tolerance, in order to prevent re-injury. [] Progressing: [] Met: [] Not Met: [] Adjusted   2. Patient will have a decrease in pain to facilitate improvement in movement, function, and ADLs as indicated by Functional Deficits. [] Progressing: [] Met: [] Not Met: [] Adjusted   3. Patient will demonstrate increased AROM to 120+ flex, 60+ ERto allow for proper joint functioning as indicated by patients Functional Deficits. 6 weeks   [] Progressing: [] Met: [] Not Met: [] Adjusted  4. Patient will return to ADLs such as bathing/dressing/grooming without increased symptoms or restriction. 6 weeks  [] Progressing: [] Met: [] Not Met: [] Adjusted    Long Term Goals: To be achieved in: 12-16+ weeks  1. Disability index score of 20% or less for the UEFI to assist with reaching prior level of function. [] Progressing: [] Met: [] Not Met: [] Adjusted  2. Patient will demonstrate increased AROM to 150+ flex, 80+ ERto allow for proper joint functioning as indicated by patients Functional Deficits.     [] Progressing: [] Met: [] Not Met: [] Adjusted  3. Patient will demonstrate an increase in Strength to 4/5 or greater to allow for proper functional mobility as indicated by patients Functional Deficits. [] Progressing: [] Met: [] Not Met: [] Adjusted  4. Patient will return to reaching/lifting/carrying without increased symptoms or restriction. [] Progressing: [] Met: [] Not Met: [] Adjusted  5. Patient will be able to lift/carry greater than 25lbs without increased symptoms or restriction in order to return to work duties. [] Progressing: [] Met: [] Not Met: [] Adjusted    Overall Progression Towards Functional goals/ Treatment Progress Update:  [] Patient is progressing as expected towards functional goals listed. [] Progression is slowed due to complexities/Impairments listed. [] Progression has been slowed due to co-morbidities. [x] Plan just implemented, too soon to assess goals progression <30days   [] Goals require adjustment due to lack of progress  [] Patient is not progressing as expected and requires additional follow up with physician  [] Other    Prognosis for POC: [x] Good [] Fair  [] Poor      Patient requires continued skilled intervention: [x] Yes  [] No    Treatment/Activity Tolerance:  [x] Patient able to complete treatment  [] Patient limited by fatigue  [] Patient limited by pain     [] Patient limited by other medical complications  [] Other: pt shows improved flex/scap ROM without pain. He has no tenderness to palpation following fall. He is still rather restricted on ER, we discussed importance of completing HEP in order to help progress ROM outside of therapy.  Continue to progress as tolerated,     Return to Play: (if applicable)   []  Stage 1: Intro to Strength   []  Stage 2: Return to Run and Strength   []  Stage 3: Return to Jump and Strength   []  Stage 4: Dynamic Strength and Agility   []  Stage 5: Sport Specific Training     []  Ready to Return to Play, Meets All Above Stages   []  Not Ready for Return to Sports   Comments:                               PLAN: See eval  [] Continue per plan of care [] Alter current plan (see comments above)  [x] Plan of care initiated [] Hold pending MD visit [] Discharge  Note: If patient does not return for scheduled/ recommended follow up visits, this note will serve as a discharge from care along with most recent update on progress. Reviewed insurance benefits for physical therapy in an outpatient hospital based setting with the patient, including deductible  and allowable visit number.  Pt was informed of possible out of pocket costs, as well as, informed of other service options for continuing supervised sessions without required skilled PT intervention such as the [facility] program.     Electronically signed by:  Nisha Paez, PT, DPT, Cert DN

## 2022-03-04 ENCOUNTER — HOSPITAL ENCOUNTER (OUTPATIENT)
Dept: PHYSICAL THERAPY | Age: 47
Setting detail: THERAPIES SERIES
Discharge: HOME | End: 2022-03-04
Payer: COMMERCIAL

## 2022-03-04 PROCEDURE — 97110 THERAPEUTIC EXERCISES: CPT

## 2022-03-04 PROCEDURE — 97530 THERAPEUTIC ACTIVITIES: CPT

## 2022-03-04 NOTE — PLAN OF CARE
[facility], [place]    Physical Therapy Re-Certification Plan of Iman Zapata      Dear  Dr. Caity Veloz,     We had the pleasure of treating the following patient for physical therapy services at [address] [place] [address]. A summary of our findings can be found in the updated assessment below. This includes our plan of care. If you have any questions or concerns regarding these findings, please do not hesitate to contact me at the office phone number checked above. Thank you for the referral.     Physician Signature:________________________________Date:__________________  By signing above (or electronic signature), therapists plan is approved by physician    Date Range Of Visits: 1/28/22-3/4/22  Total Visits to Date: 7  Overall Response to Treatment:   []Patient is responding well to treatment and improvement is noted with regards  to goals   []Patient should continue to improve in reasonable time if they continue HEP   []Patient has plateaued and is no longer responding to skilled PT intervention    []Patient is getting worse and would benefit from return to referring MD   []Patient unable to adhere to initial POC   []Other: Pt is 5 weeks sp RTC repair. He did incur a fall 5 days ago where he missed his seat and fell to the floor. he noted at the time increased pain in shoulder that had dissipated over the course of 2 days back to base line. He reports today increased pain that began the night after last session with TTP into posterior shoulder/infraspinatus and superior shoulder to deltoid insertion. ROM is still rather restricted in ER and IR, pt reports pain with gentle sleeper stretch in same region that he feels discomfort, range limited to less than 25 deg. At end of session, pt does not he has less pain and more muscle fatigue type feeling.  Pt will continue to benefit from skilled intervention 2x per week for 6-8 weeks        Physical Therapy Daily Treatment Note  Date:  3/4/2022    Patient Name:  Kellie Jacinto    :  1975  MRN: [id number]  Medical/Treatment Diagnosis Information:  · Diagnosis: S46.011A Right Rotator cuff tear; sp RTC repair on 22  · Treatment Diagnosis: S46.011A Right Rotator cuff tear  Insurance/Certification information:  PT Insurance Information: [id number]  Physician Information:  Referring Practitioner: Stan Anne  Has the plan of care been signed (Y/N):        []  Yes  [x]  No     Date of Patient follow up with Physician: 3/09/22      Is this a Progress Report:     []  Yes  [x]  No        Progress report will be due (10 Rx or 30 days whichever is less):        Recertification will be due (POC Duration  / 90 days whichever is less):          Visit # Insurance Allowable Auth Required   7 18 12/3/21-4/15/22 []  Yes []  No      Functional Scale: UEFI 17%     Date assessed:  3/4/22   Functional Scale: UEFI 100%     Date assessed:  22      Latex Allergy:  [x]NO      []YES  Preferred Language for Healthcare:   [x]English       []other:    Pain level: 5-6/10     SUBJECTIVE:  Pt is 5 week sp. Pt notes that later the night after last session he felt increased pain in shoulder so he hasn't done any of his HEP for fear of further injury. He notes that he has pain in posterior shoulder and at lateral shoulder in deltoid. He has not taken any OTC medication. OBJECTIVE:   Observation:    Test measurements:      ROM PROM AROM  Comment    L R  22 L R    Flexion  149 pulleys       Abduction  156 pulleys scaption       ER  35 supine  Cane;  PROM 45      IR  21 sleeper      Other        Other             Strength L R Comment   Flexion      Abduction      ER      IR      Supraspinatus      Upper Trap      Lower Trap      Mid Trap      Rhomboids      Biceps      Triceps      Horizontal Abduction      Horizontal Adduction      Lats          RESTRICTIONS/PRECAUTIONS: sp R RTC repair w/biceps tenodesis and subacromial decomp; see Letters 216;  May progress Forward Elevation by 10 degrees weekly up to 140 and External Rotation 5 degrees weekly up to 40      Exercises:  Exercise/Equipment Resistance Repetitions Other comments   Stretching/PROM      Wand  ER supine 10x10     Table Slides  Flexion, scaption 10x10         Sleeper IR  10x10     Pendulum       PROM Elbow  10x10    Isometrics      Retraction            Weight shift      Flexion      Abduction  10x10    External Rotation      Internal Rotation      Biceps      Triceps            PRE's      Flexion      Abduction      External Rotation      Internal Rotation      Shrugs  3x10    EXT      Reverse Flys      Serratus  2x10     Horizontal Abd with ER      Biceps      Triceps      Retraction      UK deltoid  3 min mini painfree arc     Cable Column/Theraband      External Rotation      Internal Rotation      Shrugs      Lats      Ext      Flex      Scapular Retraction  3x10 blue     BIC      TRIC      PNF            Dynamic Stability            Plyoback                Therapeutic Exercise and NMR EXR  [] (72914) Provided verbal/tactile cueing for activities related to strengthening, flexibility, endurance, ROM  for improvements in scapular, scapulothoracic and UE control with self care, reaching, carrying, lifting, house/yardwork, driving/computer work.    [] (29734) Provided verbal/tactile cueing for activities related to improving balance, coordination, kinesthetic sense, posture, motor skill, proprioception  to assist with  scapular, scapulothoracic and UE control with self care, reaching, carrying, lifting, house/yardwork, driving/computer work. Therapeutic Activities:    [] (30241 or 47518) Provided verbal/tactile cueing for activities related to improving balance, coordination, kinesthetic sense, posture, motor skill, proprioception and motor activation to allow for proper function of scapular, scapulothoracic and UE control with self care, carrying, lifting, driving/computer work.      Home Exercise Program:    [x] (40868) Reviewed/Progressed HEP activities related to strengthening, flexibility, endurance, ROM of scapular, scapulothoracic and UE control with self care, reaching, carrying, lifting, house/yardwork, driving/computer work  [] (92109) Reviewed/Progressed HEP activities related to improving balance, coordination, kinesthetic sense, posture, motor skill, proprioception of scapular, scapulothoracic and UE control with self care, reaching, carrying, lifting, house/yardwork, driving/computer work      Manual Treatments:  PROM / STM / Oscillations-Mobs:  G-I, II, III, IV (PA's, Inf., Post.)  [] (39701) Provided manual therapy to mobilize soft tissue/joints of cervical/CT, scapular GHJ and UE for the purpose of modulating pain, promoting relaxation,  increasing ROM, reducing/eliminating soft tissue swelling/inflammation/restriction, improving soft tissue extensibility and allowing for proper ROM for normal function with self care, reaching, carrying, lifting, house/yardwork, driving/computer work      Modalities:      Charges: 207-250  Timed Code Treatment Minutes: 42   Total Treatment Minutes: 43     [] EVAL (LOW) 46321   [] EVAL (MOD) 78079   [] EVAL (HIGH) 37392   [] RE-EVAL   [x] YT(80696) x  2 (207-235, 27)  [] IONTO  [] NMR (22724) x     [] VASO  [] Manual (22577) x   [] Other:  [x] TA x 1 (235-250, 15)   [] Mech Traction (41679)  [] ES(attended) (20187)      [] ES (un) (90368):       HEP instruction:   Access Code: [email or web address]  URL: [email or web address]  Date: 02/18/2022  Prepared by: Blaise Camargo     Exercises  Seated Shoulder Flexion Towel Slide at Table Top - 1 x daily - 7 x weekly - 1 sets - 10 reps - 10 hold  Seated Shoulder Scaption Slide at Table Top with Forearm in Neutral - 1 x daily - 7 x weekly - 1 sets - 10 reps - 10 hold  Standing Shoulder Internal Rotation AAROM Behind Back with Towel - 1 x daily - 7 x weekly - 1 sets - 10 reps - 10 hold  Supported Elbow Flexion Extension PROM - 1 x daily - 7 x weekly - 1 sets - 10 reps - 10 hold  Seated Shoulder External Rotation AAROM with Cane and Hand in Neutral - 1 x daily - 7 x weekly - 1 sets - 10 reps - 10 hold  Seated Shoulder Shrugs - 1 x daily - 7 x weekly - 3 sets - 10 reps  Seated Scapular Retraction - 1 x daily - 7 x weekly - 3 sets - 10 reps  Wrist Flexion Extension AROM - Palms Down - 1 x daily - 7 x weekly - 3 sets - 10 reps  Seated Gripping Towel - 1 x daily - 7 x weekly - 3 sets - 10 reps      GOALS:  Patient stated goal: return to work duties without pain    [] Progressing: [] Met: [] Not Met: [] Adjusted    Therapist goals for Patient:   Short Term Goals: To be achieved in: 2-6 weeks  1. Independent in HEP and progression per patient tolerance, in order to prevent re-injury. [] Progressing: [] Met: [] Not Met: [] Adjusted   2. Patient will have a decrease in pain to facilitate improvement in movement, function, and ADLs as indicated by Functional Deficits. [x] Progressing: [] Met: [] Not Met: [] Adjusted   3. Patient will demonstrate increased AROM to 120+ flex, 60+ ERto allow for proper joint functioning as indicated by patients Functional Deficits. 6 weeks   [x] Progressing: [] Met: [] Not Met: [] Adjusted  4. Patient will return to ADLs such as bathing/dressing/grooming without increased symptoms or restriction. 6 weeks  [x] Progressing: [] Met: [] Not Met: [] Adjusted    Long Term Goals: To be achieved in: 12-16+ weeks  1. Disability index score of 20% or less for the UEFI to assist with reaching prior level of function. [] Progressing: [x] Met: [] Not Met: [] Adjusted  2. Patient will demonstrate increased AROM to 150+ flex, 80+ ERto allow for proper joint functioning as indicated by patients Functional Deficits. [x] Progressing: [] Met: [] Not Met: [] Adjusted  3.  Patient will demonstrate an increase in Strength to 4/5 or greater to allow for proper functional mobility as indicated by patients Functional Deficits. [x] Progressing: [] Met: [] Not Met: [] Adjusted  4. Patient will return to reaching/lifting/carrying without increased symptoms or restriction. [x] Progressing: [] Met: [] Not Met: [] Adjusted  5. Patient will be able to lift/carry greater than 25lbs without increased symptoms or restriction in order to return to work duties. [x] Progressing: [] Met: [] Not Met: [] Adjusted    Overall Progression Towards Functional goals/ Treatment Progress Update:  [] Patient is progressing as expected towards functional goals listed. [x] Progression is slowed due to complexities/Impairments listed. [x] Progression has been slowed due to co-morbidities. [] Plan just implemented, too soon to assess goals progression <30days   [] Goals require adjustment due to lack of progress  [] Patient is not progressing as expected and requires additional follow up with physician  [] Other    Prognosis for POC: [x] Good [] Fair  [] Poor      Patient requires continued skilled intervention: [x] Yes  [] No    Treatment/Activity Tolerance:  [x] Patient able to complete treatment  [] Patient limited by fatigue  [] Patient limited by pain     [] Patient limited by other medical complications  [] Other: Pt is 5 weeks sp RTC repair. He did incur a fall 5 days ago where he missed his seat and fell to the floor. he noted at the time increased pain in shoulder that had dissipated over the course of 2 days back to base line. He reports today increased pain that began the night after last session with TTP into posterior shoulder/infraspinatus and superior shoulder to deltoid insertion. ROM is still rather restricted in ER and IR, pt reports pain with gentle sleeper stretch in same region that he feels discomfort, range limited to less than 25 deg. At end of session, pt does not he has less pain and more muscle fatigue type feeling.  Pt will continue to benefit from skilled intervention 2x per week for 6-8 weeks     Return to Play: (if applicable)   []  Stage 1: Intro to Strength   []  Stage 2: Return to Run and Strength   []  Stage 3: Return to Jump and Strength   []  Stage 4: Dynamic Strength and Agility   []  Stage 5: Sport Specific Training     []  Ready to Return to Play, Meets All Above Stages   []  Not Ready for Return to Sports   Comments:                               PLAN: See eval  [] Continue per plan of care [] Alter current plan (see comments above)  [x] Plan of care initiated [] Hold pending MD visit [] Discharge  Note: If patient does not return for scheduled/ recommended follow up visits, this note will serve as a discharge from care along with most recent update on progress. Reviewed insurance benefits for physical therapy in an outpatient hospital based setting with the patient, including deductible  and allowable visit number.  Pt was informed of possible out of pocket costs, as well as, informed of other service options for continuing supervised sessions without required skilled PT intervention such as the [facility] program.     Electronically signed by:  Edu Fowler, PT, DPT, Cert DN

## 2022-03-09 ENCOUNTER — OFFICE VISIT (OUTPATIENT)
Dept: ORTHOPEDIC SURGERY | Age: 47
End: 2022-03-09

## 2022-03-09 ENCOUNTER — HOSPITAL ENCOUNTER (OUTPATIENT)
Dept: PHYSICAL THERAPY | Age: 47
Setting detail: THERAPIES SERIES
Discharge: HOME | End: 2022-03-09
Payer: COMMERCIAL

## 2022-03-09 VITALS — BODY MASS INDEX: 32.7 KG/M2 | WEIGHT: 263 LBS | HEIGHT: 75 IN

## 2022-03-09 DIAGNOSIS — Z98.890 S/P RIGHT ROTATOR CUFF REPAIR: Primary | ICD-10-CM

## 2022-03-09 PROCEDURE — 99024 POSTOP FOLLOW-UP VISIT: CPT | Performed by: ORTHOPAEDIC SURGERY

## 2022-03-09 PROCEDURE — 97530 THERAPEUTIC ACTIVITIES: CPT

## 2022-03-09 PROCEDURE — 97110 THERAPEUTIC EXERCISES: CPT

## 2022-03-09 NOTE — LETTER
Shoulder Elbow Rehabilitation Referral    Patient Name: Mars Morris      YOB: 1975    Diagnosis:   1. S/P right rotator cuff repair        Precautions:   Post Op Instructions:  [x] Continuous passive motion (CPM)  [] Elbow range of motion  [] Exercise in plane of scapula   []  Strengthening     [] Pulley and instruction    [x] Home exercise program (copy to patient)   [] Sling when arm at risk  [] Sling or brace at all times   [] AAROM: Forward elevation to 120            [] AAROM: External rotation to 40    [] Isometric external rotator strengthening [] AAROM: internal rotation: up the back  [] Isometric abductor strengthening  [] AAROM: Internal abduction     [] Isometric internal rotator strengthening [] AAROM: cross-body adduction             Stretching:     Strengthening:  [x] Four quadrant (FE, ER, IR, CBA)  [x] Rotator cuff (ER, IR, Abd)  [] Forward Elevation    [] External Rotators     [x] External Rotation    [] Internal Rotators  [] Internal Rotation: up/back   [] Abductors     [] Internal Rotation: supine in abduction  [] Flexors  [] Cross-body abduction    [] Extensors  [] Pendulum (FE, Abd/Add, cw/ccw)  [x] Scapular Stabilizers   [] Wall-walking (FE, Abd)    [x] Shoulder shrugs     [] Table slides      [x] Rhomboid pinch  [] Elbow (flex, ext, pron, sup)    [] Lat.  Pull downs     [] Medial epicondylitis program    [] Forward punch   [] Lateral epicondylitis program    [] Internal rotators     [] Progressive resistive exercises  [] Bench Press        [] Bench press plus  Activities:     [] Lateral pull-downs  [] Rowing     [] Progressive two-hand supine press  [] Stepper/Exercise bike   [x] Biceps: curls/supination  [] Swimming  [] Water exercises    Modalities: PRN    Return to Sport:  [] Ultrasound     [] Plyometrics  [] Iontophoresis     [] Rhythmic stabilization  [] Moist heat     [] Core strengthening   [] Massage     [] Sports specific program:   [x] Cryotherapy      [] Electrical stimulation     [] Paraffin  [] Whirlpool  [] TENS    [x] Home exercise program (copy to patient). Perform exercises for:   15     minutes    2-3      times/day  [x] Supervised physical therapy  Frequency: []  1x week  [x] 2x week  [] 3x week  [] Other:   Duration: [] 2 weeks   [] 4 weeks  [x] 6 weeks  [] Other:     Additional Instructions:          Ike Tompkins MD, PhD

## 2022-03-09 NOTE — FLOWSHEET NOTE
f  [facility], [place]           Physical Therapy Daily Treatment Note  Date:  3/9/2022    Patient Name:  Lavonne Mckeon    :  1975  MRN: [id number]  Medical/Treatment Diagnosis Information:  · Diagnosis: S46.011A Right Rotator cuff tear; sp RTC repair on 22  · Treatment Diagnosis: S46.011A Right Rotator cuff tear  Insurance/Certification information:  PT Insurance Information: [facility]  Physician Information:  Referring Practitioner: Bharti Dc  Has the plan of care been signed (Y/N):        []  Yes  [x]  No     Date of Patient follow up with Physician: 3/09/22      Is this a Progress Report:     []  Yes  [x]  No        Progress report will be due (10 Rx or 30 days whichever is less):        Recertification will be due (POC Duration  / 90 days whichever is less):          Visit # Insurance Allowable Auth Required   8 18 12/3/21-4/15/22 []  Yes []  No      Functional Scale: UEFI 17%     Date assessed:  3/4/22   Functional Scale: UEFI 100%     Date assessed:  22      Latex Allergy:  [x]NO      []YES  Preferred Language for Healthcare:   [x]English       []other:    Pain level: 5-6/10     SUBJECTIVE:  Pt is 6 week sp. Pt notes that he saw MD today who said he is looking good, can continue to progress with PT. He notes he did try to move beer cases this morning and had some discomfort. He notes that he does still have posterior shoulder pain that will wake him at night.        OBJECTIVE:   Observation:    Test measurements:      ROM PROM AROM  Comment    L R  22 L R    Flexion  140 wall slides        Abduction        ER  43 supine  Cane;  PROM 45      IR  20 sleeper      Other        Other             Strength L R Comment   Flexion      Abduction      ER      IR      Supraspinatus      Upper Trap      Lower Trap      Mid Trap      Rhomboids      Biceps      Triceps      Horizontal Abduction      Horizontal Adduction      Lats          RESTRICTIONS/PRECAUTIONS: chauncey SCHERER RTC repair w/biceps tenodesis and subacromial decomp; see Letters 2/16; May progress Forward Elevation by 10 degrees weekly up to 140 and External Rotation 5 degrees weekly up to 40      Exercises:  Exercise/Equipment Resistance Repetitions Other comments   Stretching/PROM      Wand  ER supine 10x10           Pulleys  10x10 flex, scap     Wall slides   10x10 flex           Sleeper IR  10x10     Pendulum       PROM Elbow      Isometrics      Retraction            Weight shift      Flexion  10x10    Abduction  10x10    External Rotation  10x10    Internal Rotation      Biceps      Triceps            PRE's      Flexion      Abduction      External Rotation      Internal Rotation      Shrugs  3x10    EXT      Reverse Flys      Serratus  2x10     Horizontal Abd with ER      Biceps  3x10 red band ECL     Triceps      Retraction      UK deltoid  4 min mini painfree arc     Cable Column/Theraband      External Rotation      Internal Rotation      Shrugs      Lats      Ext      Flex      Scapular Retraction  3x10 blue     BIC      TRIC      PNF            Dynamic Stability            [place]                Therapeutic Exercise and NMR EXR  [] (60231) Provided verbal/tactile cueing for activities related to strengthening, flexibility, endurance, ROM  for improvements in scapular, scapulothoracic and UE control with self care, reaching, carrying, lifting, house/yardwork, driving/computer work.    [] (11631) Provided verbal/tactile cueing for activities related to improving balance, coordination, kinesthetic sense, posture, motor skill, proprioception  to assist with  scapular, scapulothoracic and UE control with self care, reaching, carrying, lifting, house/yardwork, driving/computer work.     Therapeutic Activities:    [] (44476 or 68065) Provided verbal/tactile cueing for activities related to improving balance, coordination, kinesthetic sense, posture, motor skill, proprioception and motor activation to allow for proper function of scapular, scapulothoracic and UE control with self care, carrying, lifting, driving/computer work. Home Exercise Program:    [x] (39121) Reviewed/Progressed HEP activities related to strengthening, flexibility, endurance, ROM of scapular, scapulothoracic and UE control with self care, reaching, carrying, lifting, house/yardwork, driving/computer work  [] (63207) Reviewed/Progressed HEP activities related to improving balance, coordination, kinesthetic sense, posture, motor skill, proprioception of scapular, scapulothoracic and UE control with self care, reaching, carrying, lifting, house/yardwork, driving/computer work      Manual Treatments:  PROM / STM / Oscillations-Mobs:  G-I, II, III, IV (PA's, Inf., Post.)  [] (76455) Provided manual therapy to mobilize soft tissue/joints of cervical/CT, scapular GHJ and UE for the purpose of modulating pain, promoting relaxation,  increasing ROM, reducing/eliminating soft tissue swelling/inflammation/restriction, improving soft tissue extensibility and allowing for proper ROM for normal function with self care, reaching, carrying, lifting, house/yardwork, driving/computer work      Modalities:      Charges: 200-250  Timed Code Treatment Minutes: 50   Total Treatment Minutes: 50     [] EVAL (LOW) 07719   [] EVAL (MOD) 27174   [] EVAL (HIGH) 29672   [] RE-EVAL   [x] GA(55667) x  2 (200-235, 35)  [] IONTO  [] NMR (61152) x     [] VASO  [] Manual (18183) x   [] Other:  [x] TA x 1 (235-250, 15)   [] [facility] T[facility] (42640)  [] ES(attended) (10457)      [] ES (un) (48812):       HEP instruction:   Access Code: [email or web address]  URL: [email or web address]  Date: 02/18/2022  Prepared by: Nisha Paez     Exercises  Seated Shoulder Flexion Towel Slide at Table Top - 1 x daily - 7 x weekly - 1 sets - 10 reps - 10 hold  Seated Shoulder Scaption Slide at Table Top with Forearm in Neutral - 1 x daily - 7 x weekly - 1 sets - 10 reps - 10 hold  Standing Shoulder Internal Rotation AAROM Behind Back with Towel - 1 x daily - 7 x weekly - 1 sets - 10 reps - 10 hold  Supported Elbow Flexion Extension PROM - 1 x daily - 7 x weekly - 1 sets - 10 reps - 10 hold  Seated Shoulder External Rotation AAROM with Cane and Hand in Neutral - 1 x daily - 7 x weekly - 1 sets - 10 reps - 10 hold  Seated Shoulder Shrugs - 1 x daily - 7 x weekly - 3 sets - 10 reps  Seated Scapular Retraction - 1 x daily - 7 x weekly - 3 sets - 10 reps  Wrist Flexion Extension AROM - Palms Down - 1 x daily - 7 x weekly - 3 sets - 10 reps  Seated Gripping Towel - 1 x daily - 7 x weekly - 3 sets - 10 reps      GOALS:  Patient stated goal: return to work duties without pain    [] Progressing: [] Met: [] Not Met: [] Adjusted    Therapist goals for Patient:   Short Term Goals: To be achieved in: 2-6 weeks  1. Independent in HEP and progression per patient tolerance, in order to prevent re-injury. [] Progressing: [] Met: [] Not Met: [] Adjusted   2. Patient will have a decrease in pain to facilitate improvement in movement, function, and ADLs as indicated by Functional Deficits. [x] Progressing: [] Met: [] Not Met: [] Adjusted   3. Patient will demonstrate increased AROM to 120+ flex, 60+ ERto allow for proper joint functioning as indicated by patients Functional Deficits. 6 weeks   [x] Progressing: [] Met: [] Not Met: [] Adjusted  4. Patient will return to ADLs such as bathing/dressing/grooming without increased symptoms or restriction. 6 weeks  [x] Progressing: [] Met: [] Not Met: [] Adjusted    Long Term Goals: To be achieved in: 12-16+ weeks  1. Disability index score of 20% or less for the UEFI to assist with reaching prior level of function. [] Progressing: [x] Met: [] Not Met: [] Adjusted  2. Patient will demonstrate increased AROM to 150+ flex, 80+ ERto allow for proper joint functioning as indicated by patients Functional Deficits. [x] Progressing: [] Met: [] Not Met: [] Adjusted  3.  Patient will demonstrate an increase in Strength to 4/5 or greater to allow for proper functional mobility as indicated by patients Functional Deficits. [x] Progressing: [] Met: [] Not Met: [] Adjusted  4. Patient will return to reaching/lifting/carrying without increased symptoms or restriction. [x] Progressing: [] Met: [] Not Met: [] Adjusted  5. Patient will be able to lift/carry greater than 25lbs without increased symptoms or restriction in order to return to work duties. [x] Progressing: [] Met: [] Not Met: [] Adjusted    Overall Progression Towards Functional goals/ Treatment Progress Update:  [] Patient is progressing as expected towards functional goals listed. [x] Progression is slowed due to complexities/Impairments listed. [x] Progression has been slowed due to co-morbidities. [] Plan just implemented, too soon to assess goals progression <30days   [] Goals require adjustment due to lack of progress  [] Patient is not progressing as expected and requires additional follow up with physician  [] Other    Prognosis for POC: [x] Good [] Fair  [] Poor      Patient requires continued skilled intervention: [x] Yes  [] No    Treatment/Activity Tolerance:  [x] Patient able to complete treatment  [] Patient limited by fatigue  [] Patient limited by pain     [] Patient limited by other medical complications  [] Other: pt shows mild improvement with ER but IR is still tight, reviewed form for sleeper stretch as pt was not performing correctly. He was able to add time for [place] deltoid program and was able to progress with flex, ER isos today with fatigue but no pain. Bicep curls with band were added without difficulty.       Return to Play: (if applicable)   []  Stage 1: Intro to Strength   []  Stage 2: Return to Run and Strength   []  Stage 3: Return to Jump and Strength   []  Stage 4: Dynamic Strength and Agility   []  Stage 5: Sport Specific Training     []  Ready to Return to Play, Meets All Above Stages   []  Not Ready for Return to Sports   Comments:                               PLAN: See eval  [] Continue per plan of care [] Alter current plan (see comments above)  [x] Plan of care initiated [] Hold pending MD visit [] Discharge  Note: If patient does not return for scheduled/ recommended follow up visits, this note will serve as a discharge from care along with most recent update on progress. Reviewed insurance benefits for physical therapy in an outpatient hospital based setting with the patient, including deductible  and allowable visit number.  Pt was informed of possible out of pocket costs, as well as, informed of other service options for continuing supervised sessions without required skilled PT intervention such as the cash based [employer] program.     Electronically signed by:  Adrienne Laurent, PT, DPT, Cert DN

## 2022-03-09 NOTE — PROGRESS NOTES
History of Present Illness:  Gaye Weston is a pleasant 42 y.o. male who presents for a post operative visit. He is 6 weeks out following a right shoulder arthroscopic subacromial decompression, rotator cuff repair, open subpectoral biceps tenodesis on 1/24/22. Overall He is doing okay and feels that their pain is well controlled with current pain medications. Mary Navarro does mention today that  2 weeks ago he fell while trying to sit on a chair. He does not recall exactly how did he hit the ground. His pain did not been consistent after this and he was able to continue rehab sessions. He has continued in physical therapy at the [facility]. He does not have pain which interrupts his sleep . He denies fevers, chills, numbness, tingling, and shortness of breath. Overall patient feels that his shoulder is improving overall. Medical History:  Patient's medications, allergies, past medical, surgical, social and family histories were reviewed and updated as appropriate. Review of Systems  A 14 point review of systems was completed by the patient on 10/12/21 and is available in the media section of the scanned medical record and was reviewed on 3/9/2022. Vital Signs: There were no vitals filed for this visit. General/Appearance: Alert and oriented and in no apparent distress. Skin:  There are no skin lesions, cellulitis, or extreme edema. The patient has warm and well-perfused Bilateral upper extremities with brisk capillary refill. Right Shoulder Exam:    Inspection: Shoulder incision portals are clean, dry and intact   Palpation:  No crepitus to gentle motion    Active : Forward Elevation 80    Passive Range of Motion: Forward flexion 100, external rotation 20 internal rotation can reach back pocket    Strength:  Deferred    Special Tests:  Deferred.     Neurovascular: Sensation to light touch is intact, no motor deficits, palpable radial pulses 2+    Radiology:     No new XR obtained at this time. Assessment :  Mr. Nicole Yu is a pleasant 42 y.o. patient who is now 6 weeks out following a right shoulder arthroscopic subacromial decompression, rotator cuff repair, open subpectoral biceps tenodesis on 1/24/22. Impression:  Encounter Diagnosis   Name Primary?  S/P right rotator cuff repair Yes       Office Procedures:  Orders Placed This Encounter   Procedures   [address] ([facility]     Referral Priority:   Routine     Referral Type:   Eval and Treat     Referral Reason:   Specialty Services Required     Requested Specialty:   Physical Therapy     Number of Visits Requested:   1       Treatment Plan:    Overall Nicole Yu is doing well. He may discontinue the Ultrasling brace completely,  He may begin to drive. Additionally, He must be off of pain medications during the day when driving. We recommend He continue in physical therapy with progress to strengthening exercises, he can start curling exercise of biceps as well. All of his questions were fully answered today. We would like to see Nicole Yu back in 3 weeks for follow-up visit. 3/9/2022  10:25 AM    Kandis Brunner, MD  [facility] fellow    During this examination, Lilliana Galindo functioned as a scribe for Dr. Ronda Strong. The history taking and physical examination were performed by Dr. Alexey Jacob. All counseling during the appointment was performed between the patient and Dr. Alexey Jacob.     __________________  I was physically present and personally supervised the Orthopaedic Sports Medicine Fellow in the evaluation and development of a treatment plan for this patient. I personally interviewed the patient and performed a physical examination. In addition, I discussed the patient's condition and treatment options with them. I have also reviewed and agree with the past medical, family and social history unless otherwise noted.  All of the patient's questions were answered. Ike De La Cruz MD, PhD  3/9/2022

## 2022-03-11 ENCOUNTER — HOSPITAL ENCOUNTER (OUTPATIENT)
Dept: PHYSICAL THERAPY | Age: 47
Setting detail: THERAPIES SERIES
Discharge: HOME | End: 2022-03-11
Payer: COMMERCIAL

## 2022-03-11 PROCEDURE — 97110 THERAPEUTIC EXERCISES: CPT

## 2022-03-11 PROCEDURE — 97530 THERAPEUTIC ACTIVITIES: CPT

## 2022-03-11 NOTE — FLOWSHEET NOTE
f  [facility], [facility]           Physical Therapy Daily Treatment Note  Date:  3/11/2022    Patient Name:  Mary Lou Arrieta    :  1975  MRN: [id number]  Medical/Treatment Diagnosis Information:  · Diagnosis: S46.011A Right Rotator cuff tear; sp RTC repair on 22  · Treatment Diagnosis: S46.011A Right Rotator cuff tear  Insurance/Certification information:  PT Insurance Information: [facility]  Physician Information:  Referring Practitioner: Tim Tompkins  Has the plan of care been signed (Y/N):        []  Yes  [x]  No     Date of Patient follow up with Physician: 3/09/22      Is this a Progress Report:     []  Yes  [x]  No        Progress report will be due (10 Rx or 30 days whichever is less): 67       Recertification will be due (POC Duration  / 90 days whichever is less):          Visit # Insurance Allowable Auth Required   9 18 12/3/21-4/15/22 []  Yes []  No      Functional Scale: UEFI 17%     Date assessed:  3/4/22   Functional Scale: UEFI 100%     Date assessed:  22      Latex Allergy:  [x]NO      []YES  Preferred Language for Healthcare:   [x]English       []other:    Pain level: 0/10     SUBJECTIVE:  Pt is 6 week sp. Pt notes that he mulched his yard yesterday and felt sore at night. Tried to sleep in the bed but it didn't work.  He notes that he has some soreness today in muscle but no pain       OBJECTIVE:   Observation:    Test measurements:      ROM PROM AROM  Comment    L R  22 L R    Flexion  145 wall slides        Abduction        ER  45 supine  Cane;        IR  29 sleeper      Other        Other             Strength L R Comment   Flexion      Abduction      ER      IR      Supraspinatus      Upper Trap      Lower Trap      Mid Trap      Rhomboids      Biceps      Triceps      Horizontal Abduction      Horizontal Adduction      Lats          RESTRICTIONS/PRECAUTIONS: sp R RTC repair w/biceps tenodesis and subacromial decomp; see Letters 2/16; May progress Forward Elevation by 10 degrees weekly up to 140 and External Rotation 5 degrees weekly up to 40      Exercises:  Exercise/Equipment Resistance Repetitions Other comments   Stretching/PROM      Wand  ER supine 10x10           Pulleys  10x10 flex, scap     Wall slides   10x10 flex           Sleeper IR  10x10     Pendulum       PROM Elbow      Isometrics      Retraction            Weight shift      Flexion  10x10    Abduction  10x10    External Rotation  10x10    Internal Rotation      Biceps      Triceps            PRE's      Flexion      Abduction      External Rotation      Internal Rotation      Shrugs  3x10    EXT      Reverse Flys      Serratus  2x10     Horizontal Abd with ER      Biceps  3x10 red band ECL     Triceps      Retraction      UK deltoid  5 min  painfree arc     Cable Column/Theraband      External Rotation      Internal Rotation      Shrugs      Lats      Ext      Flex      Scapular Retraction  3x10 black      BIC      TRIC      PNF            Dynamic Stability            Plyoback                Therapeutic Exercise and NMR EXR  [] (25873) Provided verbal/tactile cueing for activities related to strengthening, flexibility, endurance, ROM  for improvements in scapular, scapulothoracic and UE control with self care, reaching, carrying, lifting, house/yardwork, driving/computer work.    [] (20492) Provided verbal/tactile cueing for activities related to improving balance, coordination, kinesthetic sense, posture, motor skill, proprioception  to assist with  scapular, scapulothoracic and UE control with self care, reaching, carrying, lifting, house/yardwork, driving/computer work.     Therapeutic Activities:    [] (21444 or 04484) Provided verbal/tactile cueing for activities related to improving balance, coordination, kinesthetic sense, posture, motor skill, proprioception and motor activation to allow for proper function of scapular, scapulothoracic and UE control with self care, carrying, lifting, driving/computer work. Home Exercise Program:    [x] (35200) Reviewed/Progressed HEP activities related to strengthening, flexibility, endurance, ROM of scapular, scapulothoracic and UE control with self care, reaching, carrying, lifting, house/yardwork, driving/computer work  [] (79324) Reviewed/Progressed HEP activities related to improving balance, coordination, kinesthetic sense, posture, motor skill, proprioception of scapular, scapulothoracic and UE control with self care, reaching, carrying, lifting, house/yardwork, driving/computer work      Manual Treatments:  PROM / STM / Oscillations-Mobs:  G-I, II, III, IV (PA's, Inf., Post.)  [] (45777) Provided manual therapy to mobilize soft tissue/joints of cervical/CT, scapular GHJ and UE for the purpose of modulating pain, promoting relaxation,  increasing ROM, reducing/eliminating soft tissue swelling/inflammation/restriction, improving soft tissue extensibility and allowing for proper ROM for normal function with self care, reaching, carrying, lifting, house/yardwork, driving/computer work      Modalities:      Charges: 200-250  Timed Code Treatment Minutes: 50   Total Treatment Minutes: 50     [] EVAL (LOW) 58430   [] EVAL (MOD) 32885   [] EVAL (HIGH) 59617   [] RE-EVAL   [x] QM(21058) x  2 (200-235, 35)  [] IONTO  [] NMR (04836) x     [] VASO  [] Manual (53344) x   [] Other:  [x] TA x 1 (235-250, 15)   [] [facility] (16744)  [] ES(attended) (06127)      [] ES (un) (55678):       HEP instruction:   Access Code: [email or web address]  URL: [email or web address]  Date: 02/18/2022  Prepared by: Sole Best     Exercises  Seated Shoulder Flexion Towel Slide at Table Top - 1 x daily - 7 x weekly - 1 sets - 10 reps - 10 hold  Seated Shoulder Scaption Slide at Table Top with Forearm in Neutral - 1 x daily - 7 x weekly - 1 sets - 10 reps - 10 hold  Standing Shoulder Internal Rotation AAROM Behind Back with Towel - 1 x daily - 7 x weekly - 1 sets - 10 reps - 10 hold  Supported Elbow Flexion Extension PROM - 1 x daily - 7 x weekly - 1 sets - 10 reps - 10 hold  Seated Shoulder External Rotation AAROM with Cane and Hand in Neutral - 1 x daily - 7 x weekly - 1 sets - 10 reps - 10 hold  Seated Shoulder Shrugs - 1 x daily - 7 x weekly - 3 sets - 10 reps  Seated Scapular Retraction - 1 x daily - 7 x weekly - 3 sets - 10 reps  Wrist Flexion Extension AROM - Palms Down - 1 x daily - 7 x weekly - 3 sets - 10 reps  Seated Gripping Towel - 1 x daily - 7 x weekly - 3 sets - 10 reps      GOALS:  Patient stated goal: return to work duties without pain    [] Progressing: [] Met: [] Not Met: [] Adjusted    Therapist goals for Patient:   Short Term Goals: To be achieved in: 2-6 weeks  1. Independent in HEP and progression per patient tolerance, in order to prevent re-injury. [] Progressing: [] Met: [] Not Met: [] Adjusted   2. Patient will have a decrease in pain to facilitate improvement in movement, function, and ADLs as indicated by Functional Deficits. [x] Progressing: [] Met: [] Not Met: [] Adjusted   3. Patient will demonstrate increased AROM to 120+ flex, 60+ ERto allow for proper joint functioning as indicated by patients Functional Deficits. 6 weeks   [x] Progressing: [] Met: [] Not Met: [] Adjusted  4. Patient will return to ADLs such as bathing/dressing/grooming without increased symptoms or restriction. 6 weeks  [x] Progressing: [] Met: [] Not Met: [] Adjusted    Long Term Goals: To be achieved in: 12-16+ weeks  1. Disability index score of 20% or less for the UEFI to assist with reaching prior level of function. [] Progressing: [x] Met: [] Not Met: [] Adjusted  2. Patient will demonstrate increased AROM to 150+ flex, 80+ ERto allow for proper joint functioning as indicated by patients Functional Deficits. [x] Progressing: [] Met: [] Not Met: [] Adjusted  3.  Patient will demonstrate an increase in Strength to 4/5 or greater to allow for proper functional mobility as indicated by patients Functional Deficits. [x] Progressing: [] Met: [] Not Met: [] Adjusted  4. Patient will return to reaching/lifting/carrying without increased symptoms or restriction. [x] Progressing: [] Met: [] Not Met: [] Adjusted  5. Patient will be able to lift/carry greater than 25lbs without increased symptoms or restriction in order to return to work duties. [x] Progressing: [] Met: [] Not Met: [] Adjusted    Overall Progression Towards Functional goals/ Treatment Progress Update:  [] Patient is progressing as expected towards functional goals listed. [x] Progression is slowed due to complexities/Impairments listed. [x] Progression has been slowed due to co-morbidities. [] Plan just implemented, too soon to assess goals progression <30days   [] Goals require adjustment due to lack of progress  [] Patient is not progressing as expected and requires additional follow up with physician  [] Other    Prognosis for POC: [x] Good [] Fair  [] Poor      Patient requires continued skilled intervention: [x] Yes  [] No    Treatment/Activity Tolerance:  [x] Patient able to complete treatment  [] Patient limited by fatigue  [] Patient limited by pain     [] Patient limited by other medical complications  [] Other: pt shows mild improvement with IR today by 9 deg with maintained ER from last session. He does feel like stretching is getting easier with less pain at end ranges. Progressed time with [place] deltoid program. Continue to progress as tolerated.       Return to Play: (if applicable)   []  Stage 1: Intro to Strength   []  Stage 2: Return to Run and Strength   []  Stage 3: Return to Jump and Strength   []  Stage 4: Dynamic Strength and Agility   []  Stage 5: Sport Specific Training     []  Ready to Return to Play, Meets All Above Stages   []  Not Ready for Return to Sports   Comments:                               PLAN: See eval  [] Continue per plan of care [] Alter current plan (see comments above)  [x] Plan of care initiated [] Hold pending MD visit [] Discharge  Note: If patient does not return for scheduled/ recommended follow up visits, this note will serve as a discharge from care along with most recent update on progress. Reviewed insurance benefits for physical therapy in an outpatient hospital based setting with the patient, including deductible  and allowable visit number.  Pt was informed of possible out of pocket costs, as well as, informed of other service options for continuing supervised sessions without required skilled PT intervention such as the [facility] program.     Electronically signed by:  Filiberto White, PT, DPT, Cert DN

## 2022-03-14 ENCOUNTER — HOSPITAL ENCOUNTER (OUTPATIENT)
Dept: PHYSICAL THERAPY | Age: 47
Setting detail: THERAPIES SERIES
Discharge: HOME | End: 2022-03-14
Payer: COMMERCIAL

## 2022-03-14 PROCEDURE — 97110 THERAPEUTIC EXERCISES: CPT

## 2022-03-14 PROCEDURE — 97530 THERAPEUTIC ACTIVITIES: CPT

## 2022-03-14 NOTE — FLOWSHEET NOTE
f  [facility], [place]           Physical Therapy Daily Treatment Note  Date:  3/14/2022    Patient Name:  Rachell Aguirre    :  1975  MRN: [id number]  Medical/Treatment Diagnosis Information:  · Diagnosis: U95.046J Right Rotator cuff tear; sp RTC repair on 22  · Treatment Diagnosis: S46.011A Right Rotator cuff tear  Insurance/Certification information:  PT Insurance Information: [id number]  Physician Information:  Referring Practitioner: Alberto Vasquez  Has the plan of care been signed (Y/N):        []  Yes  [x]  No     Date of Patient follow up with Physician: 3/09/22      Is this a Progress Report:     []  Yes  [x]  No        Progress report will be due (10 Rx or 30 days whichever is less): 84       Recertification will be due (POC Duration  / 90 days whichever is less):          Visit # Insurance Allowable Auth Required   10 18 12/3/21-4/15/22 []  Yes []  No      Functional Scale: UEFI 17%     Date assessed:  3/4/22   Functional Scale: UEFI 100%     Date assessed:  22      Latex Allergy:  [x]NO      []YES  Preferred Language for Healthcare:   [x]English       []other:    Pain level: 0/10     SUBJECTIVE:  Pt is 7 week sp. He notes he is doing alright, is busy trying to get projects done this week.        OBJECTIVE:   Observation:    Test measurements:      ROM PROM AROM  Comment    L R  22 L R    Flexion  152 wall slides        Abduction        ER  50 supine  Cane;        IR  32 sleeper      Other        Other             Strength L R Comment   Flexion      Abduction      ER      IR      Supraspinatus      Upper Trap      Lower Trap      Mid Trap      Rhomboids      Biceps      Triceps      Horizontal Abduction      Horizontal Adduction      Lats          RESTRICTIONS/PRECAUTIONS: sp R RTC repair w/biceps tenodesis and subacromial decomp; see Letters 2/16; May progress Forward Elevation by 10 degrees weekly up to 140 and External Rotation 5 degrees weekly up to 40      Exercises:  Exercise/Equipment Resistance Repetitions Other comments   Stretching/PROM      Wand  ER supine 10x10           Pulleys  10x10 flex, scap     Wall slides   10x10 flex           Sleeper IR  10x10     Pendulum       PROM Elbow      Isometrics      Retraction            Weight shift      Flexion  10x10    Abduction  10x10    External Rotation  10x10    Internal Rotation      Biceps      Triceps            PRE's      Flexion      Abduction      External Rotation      Internal Rotation      Shrugs  3x10    EXT      Reverse Flys      Serratus  2x10     Horizontal Abd with ER      Biceps  3x10 red band ECL     Triceps      Retraction      UK deltoid  5 min  painfree arc     Cable Column/Theraband      External Rotation      Internal Rotation      Shrugs      Lats      Ext      Flex      Scapular Retraction  3x10 black      BIC      TRIC      PNF            Dynamic Stability            Plyoback                Therapeutic Exercise and NMR EXR  [] (73292) Provided verbal/tactile cueing for activities related to strengthening, flexibility, endurance, ROM  for improvements in scapular, scapulothoracic and UE control with self care, reaching, carrying, lifting, house/yardwork, driving/computer work.    [] (96371) Provided verbal/tactile cueing for activities related to improving balance, coordination, kinesthetic sense, posture, motor skill, proprioception  to assist with  scapular, scapulothoracic and UE control with self care, reaching, carrying, lifting, house/yardwork, driving/computer work. Therapeutic Activities:    [] (15578 or 52415) Provided verbal/tactile cueing for activities related to improving balance, coordination, kinesthetic sense, posture, motor skill, proprioception and motor activation to allow for proper function of scapular, scapulothoracic and UE control with self care, carrying, lifting, driving/computer work.      Home Exercise Program:    [x] (81588) Reviewed/Progressed - 10 hold  Seated Shoulder External Rotation AAROM with Cane and Hand in Neutral - 1 x daily - 7 x weekly - 1 sets - 10 reps - 10 hold  Seated Shoulder Shrugs - 1 x daily - 7 x weekly - 3 sets - 10 reps  Seated Scapular Retraction - 1 x daily - 7 x weekly - 3 sets - 10 reps  Wrist Flexion Extension AROM - Palms Down - 1 x daily - 7 x weekly - 3 sets - 10 reps  Seated Gripping Towel - 1 x daily - 7 x weekly - 3 sets - 10 reps      GOALS:  Patient stated goal: return to work duties without pain    [] Progressing: [] Met: [] Not Met: [] Adjusted    Therapist goals for Patient:   Short Term Goals: To be achieved in: 2-6 weeks  1. Independent in HEP and progression per patient tolerance, in order to prevent re-injury. [] Progressing: [] Met: [] Not Met: [] Adjusted   2. Patient will have a decrease in pain to facilitate improvement in movement, function, and ADLs as indicated by Functional Deficits. [x] Progressing: [] Met: [] Not Met: [] Adjusted   3. Patient will demonstrate increased AROM to 120+ flex, 60+ ERto allow for proper joint functioning as indicated by patients Functional Deficits. 6 weeks   [x] Progressing: [] Met: [] Not Met: [] Adjusted  4. Patient will return to ADLs such as bathing/dressing/grooming without increased symptoms or restriction. 6 weeks  [x] Progressing: [] Met: [] Not Met: [] Adjusted    Long Term Goals: To be achieved in: 12-16+ weeks  1. Disability index score of 20% or less for the UEFI to assist with reaching prior level of function. [] Progressing: [x] Met: [] Not Met: [] Adjusted  2. Patient will demonstrate increased AROM to 150+ flex, 80+ ERto allow for proper joint functioning as indicated by patients Functional Deficits. [x] Progressing: [] Met: [] Not Met: [] Adjusted  3. Patient will demonstrate an increase in Strength to 4/5 or greater to allow for proper functional mobility as indicated by patients Functional Deficits.    [x] Progressing: [] Met: [] Not Met: [] Adjusted  4. Patient will return to reaching/lifting/carrying without increased symptoms or restriction. [x] Progressing: [] Met: [] Not Met: [] Adjusted  5. Patient will be able to lift/carry greater than 25lbs without increased symptoms or restriction in order to return to work duties. [x] Progressing: [] Met: [] Not Met: [] Adjusted    Overall Progression Towards Functional goals/ Treatment Progress Update:  [] Patient is progressing as expected towards functional goals listed. [x] Progression is slowed due to complexities/Impairments listed. [x] Progression has been slowed due to co-morbidities. [] Plan just implemented, too soon to assess goals progression <30days   [] Goals require adjustment due to lack of progress  [] Patient is not progressing as expected and requires additional follow up with physician  [] Other    Prognosis for POC: [x] Good [] Fair  [] Poor      Patient requires continued skilled intervention: [x] Yes  [] No    Treatment/Activity Tolerance:  [x] Patient able to complete treatment  [] Patient limited by fatigue  [] Patient limited by pain     [] Patient limited by other medical complications  [] Other: pt shows improvements in all planes of motion today. Consider iso step outs next session for increased stregthening while continuing to progress towards full motion.      Return to Play: (if applicable)   []  Stage 1: Intro to Strength   []  Stage 2: Return to Run and Strength   []  Stage 3: Return to Jump and Strength   []  Stage 4: Dynamic Strength and Agility   []  Stage 5: Sport Specific Training     []  Ready to Return to Play, Meets All Above Stages   []  Not Ready for Return to Sports   Comments:                               PLAN: See eval  [] Continue per plan of care [] Alter current plan (see comments above)  [x] Plan of care initiated [] Hold pending MD visit [] Discharge  Note: If patient does not return for scheduled/ recommended follow up visits, this note will serve as a discharge from care along with most recent update on progress. Reviewed insurance benefits for physical therapy in an outpatient hospital based setting with the patient, including deductible  and allowable visit number.  Pt was informed of possible out of pocket costs, as well as, informed of other service options for continuing supervised sessions without required skilled PT intervention such as the cash based [employer] program.     Electronically signed by:  Jin Burciaga, PT, DPT, Cert DN

## 2022-03-16 ENCOUNTER — APPOINTMENT (OUTPATIENT)
Dept: PHYSICAL THERAPY | Age: 47
End: 2022-03-16
Payer: COMMERCIAL

## 2022-03-23 ENCOUNTER — HOSPITAL ENCOUNTER (OUTPATIENT)
Dept: PHYSICAL THERAPY | Age: 47
Setting detail: THERAPIES SERIES
Discharge: HOME | End: 2022-03-23
Payer: COMMERCIAL

## 2022-03-23 PROCEDURE — 97140 MANUAL THERAPY 1/> REGIONS: CPT

## 2022-03-23 PROCEDURE — 97110 THERAPEUTIC EXERCISES: CPT

## 2022-03-23 NOTE — FLOWSHEET NOTE
f  [facility], [place]           Physical Therapy Daily Treatment Note  Date:  3/23/2022    Patient Name:  Carrie Atkins    :  1975  MRN: [id number]  Medical/Treatment Diagnosis Information:  · Diagnosis: S46.011A Right Rotator cuff tear; sp RTC repair on 22  · Treatment Diagnosis: S46.011A Right Rotator cuff tear  Insurance/Certification information:  PT Insurance Information: [facility]  Physician Information:  Referring Practitioner: Bill Minor  Has the plan of care been signed (Y/N):        []  Yes  [x]  No     Date of Patient follow up with Physician: 3/09/22      Is this a Progress Report:     []  Yes  [x]  No        Progress report will be due (10 Rx or 30 days whichever is less): 32       Recertification will be due (POC Duration  / 90 days whichever is less):          Visit # Insurance Allowable Auth Required   11 18 12/3/21-4/15/22 []  Yes []  No      Functional Scale: UEFI 17%     Date assessed:  3/4/22   Functional Scale: UEFI 100%     Date assessed:  22      Latex Allergy:  [x]NO      []YES  Preferred Language for Healthcare:   [x]English       []other:    Pain level: 0/10     SUBJECTIVE:  Pt is 8 week sp. 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 He notes soreness after that. He was also playing pool on  night and since then has had increased pain in the right shoulder at acromion. He has difficulty sleeping, waking every 2 hours or so. He did have to ice shoulder yesterday which he notes helped with his pain but he has not taken other medication.         OBJECTIVE:   Observation:    Test measurements:      ROM PROM AROM  Comment    L R  22 L R    Flexion  156 wall slides        Abduction        ER  50 supine  Cane;        IR  40 sleeper      Other        Other             Strength L R Comment   Flexion      Abduction      ER      IR      Supraspinatus Upper Trap      Lower Trap      Mid Trap      Rhomboids      Biceps      Triceps      Horizontal Abduction      Horizontal Adduction      Lats          RESTRICTIONS/PRECAUTIONS: sp R RTC repair w/biceps tenodesis and subacromial decomp; see Letters 2/16; May progress Forward Elevation by 10 degrees weekly up to 140 and External Rotation 5 degrees weekly up to 40      Exercises:  Exercise/Equipment Resistance Repetitions Other comments   Stretching/PROM      Wand  ER supine 10x10           Pulleys  10x10 flex, scap     Wall slides   10x10 flex     Pec Doorway  3x30 low V     Sleeper IR  10x10     Pendulum       PROM Elbow      Isometrics      Retraction            Weight shift      Flexion     Abduction     External Rotation     Internal Rotation      Biceps      Triceps            PRE's      Flexion      Abduction      External Rotation      Internal Rotation      Shrugs     EXT     Reverse Flys     Serratus     Horizontal Abd with ER     Biceps     Triceps     Retraction     UK deltoid     Cable Column/Theraband     External Rotation     Internal Rotation     Shrugs     Lats     Ext     Flex     Scapular Retraction     BIC      TRIC      PNF            Dynamic Stability            Plyoback                Therapeutic Exercise and NMR EXR  [] (07735) Provided verbal/tactile cueing for activities related to strengthening, flexibility, endurance, ROM  for improvements in scapular, scapulothoracic and UE control with self care, reaching, carrying, lifting, house/yardwork, driving/computer work.    [] (40274) Provided verbal/tactile cueing for activities related to improving balance, coordination, kinesthetic sense, posture, motor skill, proprioception  to assist with  scapular, scapulothoracic and UE control with self care, reaching, carrying, lifting, house/yardwork, driving/computer work.     Therapeutic Activities:    [] (03385 or 40943) Provided verbal/tactile cueing for activities related to improving balance, coordination, kinesthetic sense, posture, motor skill, proprioception and motor activation to allow for proper function of scapular, scapulothoracic and UE control with self care, carrying, lifting, driving/computer work. Home Exercise Program:    [x] (41568) Reviewed/Progressed HEP activities related to strengthening, flexibility, endurance, ROM of scapular, scapulothoracic and UE control with self care, reaching, carrying, lifting, house/yardwork, driving/computer work  [] (63800) Reviewed/Progressed HEP activities related to improving balance, coordination, kinesthetic sense, posture, motor skill, proprioception of scapular, scapulothoracic and UE control with self care, reaching, carrying, lifting, house/yardwork, driving/computer work      Manual Treatments:  PROM / STM / Oscillations-Mobs:  G-I, II, III, IV (PA's, Inf., Post.)  [] (65825) Provided manual therapy to mobilize soft tissue/joints of cervical/CT, scapular GHJ and UE for the purpose of modulating pain, promoting relaxation,  increasing ROM, reducing/eliminating soft tissue swelling/inflammation/restriction, improving soft tissue extensibility and allowing for proper ROM for normal function with self care, reaching, carrying, lifting, house/yardwork, driving/computer work  Instrument Assisted Soft Tissue Mobilization (IASTM): was applied to the following muscles: infraspinatus, proximal biceps, mid deltoid, with Hawk  tools including HG2 (handle-bar), HG4 (small can-opener), HG5 (medium can-opener), HG 8 (biscotti), and HG9 (tongue depressor). Treatment consisted of IASTM strokes including sweeping, fanning, brushing, strumming, filleting, pinning and framing, based on body region contours, nature of the soft tissue restriction and desired treatment outcomes. These techniques were used to restore neurophysiology, improve mechanotransduction, enhance fluid dynamics and break collagen crosslinks.   The treatment area was exposed and the patient was draped in an appropriate manner. Upon completion the clinician cleaned the IASTM tools as per [facility] recommendations. Skin check pre:   Skin check post:   Intermittent tx time:  10'    Modalities:      Charges: 200-255  Timed Code Treatment Minutes: 50   Total Treatment Minutes: 55     [] EVAL (LOW) 64124   [] EVAL (MOD) 27013   [] EVAL (HIGH) 17791   [] RE-EVAL   [x] LQ(61284) x  2 (200-220, 235-250 35)  [] IONTO  [] NMR (22793) x     [] VASO  [x] Manual (91059) x 1 (005-426, 15')  [] Other:  [] TA x    [] Mech Traction (60497)  [] ES(attended) (49737)      [] ES (un) (17532):       HEP instruction:   Access Code: [email or web address]  URL: [email or web address]  Date: 02/18/2022  Prepared by: Cheryl Byrd     Exercises  Seated Shoulder Flexion Towel Slide at Table Top - 1 x daily - 7 x weekly - 1 sets - 10 reps - 10 hold  Seated Shoulder Scaption Slide at Table Top with Forearm in Neutral - 1 x daily - 7 x weekly - 1 sets - 10 reps - 10 hold  Standing Shoulder Internal Rotation AAROM Behind Back with Towel - 1 x daily - 7 x weekly - 1 sets - 10 reps - 10 hold  Supported Elbow Flexion Extension PROM - 1 x daily - 7 x weekly - 1 sets - 10 reps - 10 hold  Seated Shoulder External Rotation AAROM with Cane and Hand in Neutral - 1 x daily - 7 x weekly - 1 sets - 10 reps - 10 hold  Seated Shoulder Shrugs - 1 x daily - 7 x weekly - 3 sets - 10 reps  Seated Scapular Retraction - 1 x daily - 7 x weekly - 3 sets - 10 reps  Wrist Flexion Extension AROM - Palms Down - 1 x daily - 7 x weekly - 3 sets - 10 reps  Seated Gripping Towel - 1 x daily - 7 x weekly - 3 sets - 10 reps      GOALS:  Patient stated goal: return to work duties without pain    [] Progressing: [] Met: [] Not Met: [] Adjusted    Therapist goals for Patient:   Short Term Goals: To be achieved in: 2-6 weeks  1. Independent in HEP and progression per patient tolerance, in order to prevent re-injury.      [] Progressing: [] Met: [] Not Met: [] Adjusted 2. Patient will have a decrease in pain to facilitate improvement in movement, function, and ADLs as indicated by Functional Deficits. [x] Progressing: [] Met: [] Not Met: [] Adjusted   3. Patient will demonstrate increased AROM to 120+ flex, 60+ ERto allow for proper joint functioning as indicated by patients Functional Deficits. 6 weeks   [x] Progressing: [] Met: [] Not Met: [] Adjusted  4. Patient will return to ADLs such as bathing/dressing/grooming without increased symptoms or restriction. 6 weeks  [x] Progressing: [] Met: [] Not Met: [] Adjusted    Long Term Goals: To be achieved in: 12-16+ weeks  1. Disability index score of 20% or less for the UEFI to assist with reaching prior level of function. [] Progressing: [x] Met: [] Not Met: [] Adjusted  2. Patient will demonstrate increased AROM to 150+ flex, 80+ ERto allow for proper joint functioning as indicated by patients Functional Deficits. [x] Progressing: [] Met: [] Not Met: [] Adjusted  3. Patient will demonstrate an increase in Strength to 4/5 or greater to allow for proper functional mobility as indicated by patients Functional Deficits. [x] Progressing: [] Met: [] Not Met: [] Adjusted  4. Patient will return to reaching/lifting/carrying without increased symptoms or restriction. [x] Progressing: [] Met: [] Not Met: [] Adjusted  5. Patient will be able to lift/carry greater than 25lbs without increased symptoms or restriction in order to return to work duties. [x] Progressing: [] Met: [] Not Met: [] Adjusted    Overall Progression Towards Functional goals/ Treatment Progress Update:  [] Patient is progressing as expected towards functional goals listed. [x] Progression is slowed due to complexities/Impairments listed. [x] Progression has been slowed due to co-morbidities.   [] Plan just implemented, too soon to assess goals progression <30days   [] Goals require adjustment due to lack of progress  [] Patient is not progressing as expected and requires additional follow up with physician  [] Other    Prognosis for POC: [x] Good [] Fair  [] Poor      Patient requires continued skilled intervention: [x] Yes  [] No    Treatment/Activity Tolerance:  [x] Patient able to complete treatment  [] Patient limited by fatigue  [] Patient limited by pain     [] Patient limited by other medical complications  [] Other: pt shows improved flex, IR but ER is still rather tight and painful. He notes that iastm feels good and at end of session he has less pain than when he walked in. Pt was advised that aleve/advil PM to help with sleeping at night as he was never given the meloxicam that was mentioned at last MD follow up. Continue to progress as tolerated     Return to Play: (if applicable)   []  Stage 1: Intro to Strength   []  Stage 2: Return to Run and Strength   []  Stage 3: Return to Jump and Strength   []  Stage 4: Dynamic Strength and Agility   []  Stage 5: Sport Specific Training     []  Ready to Return to Play, Meets All Above Stages   []  Not Ready for Return to Sports   Comments:                               PLAN: See eval  [] Continue per plan of care [] Alter current plan (see comments above)  [x] Plan of care initiated [] Hold pending MD visit [] Discharge  Note: If patient does not return for scheduled/ recommended follow up visits, this note will serve as a discharge from care along with most recent update on progress. Reviewed insurance benefits for physical therapy in an outpatient hospital based setting with the patient, including deductible  and allowable visit number.  Pt was informed of possible out of pocket costs, as well as, informed of other service options for continuing supervised sessions without required skilled PT intervention such as the cash based [employer] program.     Electronically signed by:  Merlin Flax, PT, DPT, Cert DN

## 2022-03-25 ENCOUNTER — HOSPITAL ENCOUNTER (OUTPATIENT)
Dept: PHYSICAL THERAPY | Age: 47
Setting detail: THERAPIES SERIES
Discharge: HOME | End: 2022-03-25
Payer: COMMERCIAL

## 2022-03-25 PROCEDURE — 97530 THERAPEUTIC ACTIVITIES: CPT

## 2022-03-25 PROCEDURE — 97110 THERAPEUTIC EXERCISES: CPT

## 2022-03-25 NOTE — FLOWSHEET NOTE
f  [facility], [place]           Physical Therapy Daily Treatment Note  Date:  3/25/2022    Patient Name:  Jason Castro    :  1975  MRN: [id number]  Medical/Treatment Diagnosis Information:  · Diagnosis: S46.011A Right Rotator cuff tear; sp RTC repair on 22  · Treatment Diagnosis: S46.011A Right Rotator cuff tear  Insurance/Certification information:  PT Insurance Information: [facility]  Physician Information:  Referring Practitioner: Maxine Evans  Has the plan of care been signed (Y/N):        []  Yes  [x]  No     Date of Patient follow up with Physician: 3/09/22      Is this a Progress Report:     []  Yes  [x]  No        Progress report will be due (10 Rx or 30 days whichever is less): 63       Recertification will be due (POC Duration  / 90 days whichever is less):          Visit # Insurance Allowable Auth Required   12 18 12/3/21-4/15/22 []  Yes []  No      Functional Scale: UEFI 17%     Date assessed:  3/4/22   Functional Scale: UEFI 100%     Date assessed:  22      Latex Allergy:  [x]NO      []YES  Preferred Language for Healthcare:   [x]English       []other:    Pain level: 0/10     SUBJECTIVE:  Pt is 8 week sp. Pt notes that he did  meloxicam. He notes he started it Wednesday afternoon, only woke up twice d/t pain that night. He notes that he slept through the night entirely last night and has no pain today.           OBJECTIVE:   Observation:    Test measurements:      ROM PROM AROM  Comment    L R  22 L R    Flexion  160 wall slides        Abduction  150 wall slides       ER  60 supine  Cane;        IR  33 sleeper      Other        Other             Strength L R Comment   Flexion      Abduction      ER      IR      Supraspinatus      Upper Trap      Lower Trap      Mid Trap      Rhomboids      Biceps      Triceps      Horizontal Abduction      Horizontal Adduction      Lats          RESTRICTIONS/PRECAUTIONS: sp R RTC repair w/biceps tenodesis and function of scapular, scapulothoracic and UE control with self care, carrying, lifting, driving/computer work. Home Exercise Program:    [x] (70358) Reviewed/Progressed HEP activities related to strengthening, flexibility, endurance, ROM of scapular, scapulothoracic and UE control with self care, reaching, carrying, lifting, house/yardwork, driving/computer work  [] (56944) Reviewed/Progressed HEP activities related to improving balance, coordination, kinesthetic sense, posture, motor skill, proprioception of scapular, scapulothoracic and UE control with self care, reaching, carrying, lifting, house/yardwork, driving/computer work      Manual Treatments:  PROM / STM / Oscillations-Mobs:  G-I, II, III, IV (PA's, Inf., Post.)  [] (35738) Provided manual therapy to mobilize soft tissue/joints of cervical/CT, scapular GHJ and UE for the purpose of modulating pain, promoting relaxation,  increasing ROM, reducing/eliminating soft tissue swelling/inflammation/restriction, improving soft tissue extensibility and allowing for proper ROM for normal function with self care, reaching, carrying, lifting, house/yardwork, driving/computer work  I    Modalities:      Charges: 205-245  Timed Code Treatment Minutes: 40   Total Treatment Minutes: 40     [] EVAL (LOW) 59887   [] EVAL (MOD) 05545   [] EVAL (HIGH) 56685   [] RE-EVAL   [x] GM(30557) x  2 (205-235, 30 )  [] IONTO  [] NMR (72146) x     [] VASO  [] Manual (16367) x   [] Other:  [x] TA x 1 (235-245, 10')   [] [facility] Traction (94987)  [] ES(attended) (43723)      [] ES (un) (79194):       HEP instruction:   Access Code: [email or web address]  URL: [email or web address]  Date: 02/18/2022  Prepared by: Adrienne Laurent     Exercises  Seated Shoulder Flexion Towel Slide at Table Top - 1 x daily - 7 x weekly - 1 sets - 10 reps - 10 hold  Seated Shoulder Scaption Slide at Table Top with Forearm in Neutral - 1 x daily - 7 x weekly - 1 sets - 10 reps - 10 hold  Standing Shoulder Internal Rotation AAROM Behind Back with Towel - 1 x daily - 7 x weekly - 1 sets - 10 reps - 10 hold  Supported Elbow Flexion Extension PROM - 1 x daily - 7 x weekly - 1 sets - 10 reps - 10 hold  Seated Shoulder External Rotation AAROM with Cane and Hand in Neutral - 1 x daily - 7 x weekly - 1 sets - 10 reps - 10 hold  Seated Shoulder Shrugs - 1 x daily - 7 x weekly - 3 sets - 10 reps  Seated Scapular Retraction - 1 x daily - 7 x weekly - 3 sets - 10 reps  Wrist Flexion Extension AROM - Palms Down - 1 x daily - 7 x weekly - 3 sets - 10 reps  Seated Gripping Towel - 1 x daily - 7 x weekly - 3 sets - 10 reps      GOALS:  Patient stated goal: return to work duties without pain    [] Progressing: [] Met: [] Not Met: [] Adjusted    Therapist goals for Patient:   Short Term Goals: To be achieved in: 2-6 weeks  1. Independent in HEP and progression per patient tolerance, in order to prevent re-injury. [] Progressing: [] Met: [] Not Met: [] Adjusted   2. Patient will have a decrease in pain to facilitate improvement in movement, function, and ADLs as indicated by Functional Deficits. [x] Progressing: [] Met: [] Not Met: [] Adjusted   3. Patient will demonstrate increased AROM to 120+ flex, 60+ ERto allow for proper joint functioning as indicated by patients Functional Deficits. 6 weeks   [x] Progressing: [] Met: [] Not Met: [] Adjusted  4. Patient will return to ADLs such as bathing/dressing/grooming without increased symptoms or restriction. 6 weeks  [x] Progressing: [] Met: [] Not Met: [] Adjusted    Long Term Goals: To be achieved in: 12-16+ weeks  1. Disability index score of 20% or less for the UEFI to assist with reaching prior level of function. [] Progressing: [x] Met: [] Not Met: [] Adjusted  2. Patient will demonstrate increased AROM to 150+ flex, 80+ ERto allow for proper joint functioning as indicated by patients Functional Deficits. [x] Progressing: [] Met: [] Not Met: [] Adjusted  3.  Patient will demonstrate an increase in Strength to 4/5 or greater to allow for proper functional mobility as indicated by patients Functional Deficits. [x] Progressing: [] Met: [] Not Met: [] Adjusted  4. Patient will return to reaching/lifting/carrying without increased symptoms or restriction. [x] Progressing: [] Met: [] Not Met: [] Adjusted  5. Patient will be able to lift/carry greater than 25lbs without increased symptoms or restriction in order to return to work duties. [x] Progressing: [] Met: [] Not Met: [] Adjusted    Overall Progression Towards Functional goals/ Treatment Progress Update:  [] Patient is progressing as expected towards functional goals listed. [x] Progression is slowed due to complexities/Impairments listed. [x] Progression has been slowed due to co-morbidities. [] Plan just implemented, too soon to assess goals progression <30days   [] Goals require adjustment due to lack of progress  [] Patient is not progressing as expected and requires additional follow up with physician  [] Other    Prognosis for POC: [x] Good [] Fair  [] Poor      Patient requires continued skilled intervention: [x] Yes  [] No    Treatment/Activity Tolerance:  [x] Patient able to complete treatment  [] Patient limited by fatigue  [] Patient limited by pain     [] Patient limited by other medical complications  [] Other: pt shows improved ROM into flex/abd/ER but is slightly tighter into IR in sleeper stretch. He notes less pain today with all activities so strengthening was resumed. Discussed activity modification still as he is feeling less pain but this does not mean the shoulder can handle increased stress just yet.      Return to Play: (if applicable)   []  Stage 1: Intro to Strength   []  Stage 2: Return to Run and Strength   []  Stage 3: Return to Jump and Strength   []  Stage 4: Dynamic Strength and Agility   []  Stage 5: Sport Specific Training     []  Ready to Return to Play, Meets All Above Stages   []  Not Ready for Return to Sports   Comments:                               PLAN: See eval  [] Continue per plan of care [] Alter current plan (see comments above)  [x] Plan of care initiated [] Hold pending MD visit [] Discharge  Note: If patient does not return for scheduled/ recommended follow up visits, this note will serve as a discharge from care along with most recent update on progress. Reviewed insurance benefits for physical therapy in an outpatient hospital based setting with the patient, including deductible  and allowable visit number.  Pt was informed of possible out of pocket costs, as well as, informed of other service options for continuing supervised sessions without required skilled PT intervention such as the cash based [employer] program.     Electronically signed by:  Nisha Paez, PT, DPT, Cert DN

## 2022-03-30 ENCOUNTER — HOSPITAL ENCOUNTER (OUTPATIENT)
Dept: PHYSICAL THERAPY | Age: 47
Setting detail: THERAPIES SERIES
Discharge: HOME | End: 2022-03-30
Payer: COMMERCIAL

## 2022-03-30 PROCEDURE — 97530 THERAPEUTIC ACTIVITIES: CPT

## 2022-03-30 PROCEDURE — 97110 THERAPEUTIC EXERCISES: CPT

## 2022-03-30 NOTE — FLOWSHEET NOTE
f  [facility], [place]           Physical Therapy Daily Treatment Note  Date:  3/30/2022    Patient Name:  Mansoor Weeks    :  1975  MRN: [id number]  Medical/Treatment Diagnosis Information:  · Diagnosis: S46.011A Right Rotator cuff tear; sp RTC repair on 22  · Treatment Diagnosis: O04.652E Right Rotator cuff tear  Insurance/Certification information:  PT Insurance Information: [facility]  Physician Information:  Referring Practitioner: Claudia Bill  Has the plan of care been signed (Y/N):        []  Yes  [x]  No     Date of Patient follow up with Physician:       Is this a Progress Report:     []  Yes  [x]  No        Progress report will be due (10 Rx or 30 days whichever is less): 53       Recertification will be due (POC Duration  / 90 days whichever is less):          Visit # Insurance Allowable Auth Required   13 18 12/3/21-4/15/22 []  Yes []  No      Functional Scale: UEFI 17%     Date assessed:  3/4/22   Functional Scale: UEFI 100%     Date assessed:  22      Latex Allergy:  [x]NO      []YES  Preferred Language for Healthcare:   [x]English       []other:    Pain level: 0/10     SUBJECTIVE:  Pt is 9 week sp. Pt notes he was doing well but began having pain at night again earlier this week. He does not note increased activity that bothered him over the weekend.  He notes he wakes nearly every hour but the pain is less severe than it was prior to starting meloxicam.       OBJECTIVE:   Observation:    Test measurements:      ROM PROM AROM  Comment    L R  22 L R    Flexion  160 wall slides        Abduction  150 wall slides       ER  65 supine  Cane        IR  40 sleeper      Other        Other             Strength L R Comment   Flexion      Abduction      ER      IR      Supraspinatus      Upper Trap      Lower Trap      Mid Trap      Rhomboids      Biceps      Triceps      Horizontal Abduction      Horizontal Adduction      Lats          RESTRICTIONS/PRECAUTIONS: sp R RTC repair w/biceps tenodesis and subacromial decomp; see Letters 2/16; May progress Forward Elevation by 10 degrees weekly up to 140 and External Rotation 5 degrees weekly up to 40      Exercises:  Exercise/Equipment Resistance Repetitions Other comments   Stretching/PROM      Wand  ER supine 10x10           Pulleys  10x10 flex, scap     Wall slides   10x10 flex, abd     Pec Doorway  3x30 low V     Sleeper IR  10x10     Pendulum       PROM Elbow      Isometrics      Retraction            Weight shift      Flexion     Abduction     External Rotation     Internal Rotation      Biceps      Triceps            PRE's      Flexion      Abduction      External Rotation      Internal Rotation      Shrugs  3x10    EXT      Reverse Flys      Serratus      Horizontal Abd with ER      Biceps  3x10 red band ECL     Triceps  3x10 silver pull down     Retraction      UK deltoid  5 min  painfree arc     Cable Column/Theraband      External Rotation      Internal Rotation      Shrugs      Lats      Ext  3x10 black     Flex      Scapular Retraction  3x10 black      BIC      TRIC      PNF            Dynamic Stability            Plyoback                Therapeutic Exercise and NMR EXR  [] (08399) Provided verbal/tactile cueing for activities related to strengthening, flexibility, endurance, ROM  for improvements in scapular, scapulothoracic and UE control with self care, reaching, carrying, lifting, house/yardwork, driving/computer work.    [] (65947) Provided verbal/tactile cueing for activities related to improving balance, coordination, kinesthetic sense, posture, motor skill, proprioception  to assist with  scapular, scapulothoracic and UE control with self care, reaching, carrying, lifting, house/yardwork, driving/computer work.     Therapeutic Activities:    [] (83418 or 57113) Provided verbal/tactile cueing for activities related to improving balance, coordination, kinesthetic sense, posture, motor skill, proprioception and motor activation to allow for proper function of scapular, scapulothoracic and UE control with self care, carrying, lifting, driving/computer work. Home Exercise Program:    [x] (25092) Reviewed/Progressed HEP activities related to strengthening, flexibility, endurance, ROM of scapular, scapulothoracic and UE control with self care, reaching, carrying, lifting, house/yardwork, driving/computer work  [] (44905) Reviewed/Progressed HEP activities related to improving balance, coordination, kinesthetic sense, posture, motor skill, proprioception of scapular, scapulothoracic and UE control with self care, reaching, carrying, lifting, house/yardwork, driving/computer work      Manual Treatments:  PROM / STM / Oscillations-Mobs:  G-I, II, III, IV (PA's, Inf., Post.)  [] (07710) Provided manual therapy to mobilize soft tissue/joints of cervical/CT, scapular GHJ and UE for the purpose of modulating pain, promoting relaxation,  increasing ROM, reducing/eliminating soft tissue swelling/inflammation/restriction, improving soft tissue extensibility and allowing for proper ROM for normal function with self care, reaching, carrying, lifting, house/yardwork, driving/computer work  I    Modalities:      Charges: 200-245  Timed Code Treatment Minutes: 45   Total Treatment Minutes: 45     [] EVAL (LOW) 74128   [] EVAL (MOD) 66079   [] EVAL (HIGH) 33012   [] RE-EVAL   [x] SG(19756) x  2 (200-230, 30 )  [] IONTO  [] NMR (83957) x     [] VASO  [] Manual (16987) x   [] Other:  [x] TA x 1 (157-698, 15')   [] [facility] T[facility] (67549)  [] ES(attended) (69194)      [] ES (un) (30493):       HEP instruction:   Access Code: [email or web address]  URL: [email or web address]  Date: 02/18/2022  Prepared by: Dre Bravo     Exercises  Seated Shoulder Flexion Towel Slide at Table Top - 1 x daily - 7 x weekly - 1 sets - 10 reps - 10 hold  Seated Shoulder Scaption Slide at Table Top with Forearm in Neutral - 1 x daily - 7 x weekly - 1 sets - 10 reps - 10 hold  Standing Shoulder Internal Rotation AAROM Behind Back with Towel - 1 x daily - 7 x weekly - 1 sets - 10 reps - 10 hold  Supported Elbow Flexion Extension PROM - 1 x daily - 7 x weekly - 1 sets - 10 reps - 10 hold  Seated Shoulder External Rotation AAROM with Cane and Hand in Neutral - 1 x daily - 7 x weekly - 1 sets - 10 reps - 10 hold  Seated Shoulder Shrugs - 1 x daily - 7 x weekly - 3 sets - 10 reps  Seated Scapular Retraction - 1 x daily - 7 x weekly - 3 sets - 10 reps  Wrist Flexion Extension AROM - Palms Down - 1 x daily - 7 x weekly - 3 sets - 10 reps  Seated Gripping Towel - 1 x daily - 7 x weekly - 3 sets - 10 reps      GOALS:  Patient stated goal: return to work duties without pain    [] Progressing: [] Met: [] Not Met: [] Adjusted    Therapist goals for Patient:   Short Term Goals: To be achieved in: 2-6 weeks  1. Independent in HEP and progression per patient tolerance, in order to prevent re-injury. [] Progressing: [] Met: [] Not Met: [] Adjusted   2. Patient will have a decrease in pain to facilitate improvement in movement, function, and ADLs as indicated by Functional Deficits. [x] Progressing: [] Met: [] Not Met: [] Adjusted   3. Patient will demonstrate increased AROM to 120+ flex, 60+ ERto allow for proper joint functioning as indicated by patients Functional Deficits. 6 weeks   [x] Progressing: [] Met: [] Not Met: [] Adjusted  4. Patient will return to ADLs such as bathing/dressing/grooming without increased symptoms or restriction. 6 weeks  [x] Progressing: [] Met: [] Not Met: [] Adjusted    Long Term Goals: To be achieved in: 12-16+ weeks  1. Disability index score of 20% or less for the UEFI to assist with reaching prior level of function. [] Progressing: [x] Met: [] Not Met: [] Adjusted  2. Patient will demonstrate increased AROM to 150+ flex, 80+ ERto allow for proper joint functioning as indicated by patients Functional Deficits.     [x] Progressing: [] Met: [] Not Met: [] Adjusted  3. Patient will demonstrate an increase in Strength to 4/5 or greater to allow for proper functional mobility as indicated by patients Functional Deficits. [x] Progressing: [] Met: [] Not Met: [] Adjusted  4. Patient will return to reaching/lifting/carrying without increased symptoms or restriction. [x] Progressing: [] Met: [] Not Met: [] Adjusted  5. Patient will be able to lift/carry greater than 25lbs without increased symptoms or restriction in order to return to work duties. [x] Progressing: [] Met: [] Not Met: [] Adjusted    Overall Progression Towards Functional goals/ Treatment Progress Update:  [] Patient is progressing as expected towards functional goals listed. [x] Progression is slowed due to complexities/Impairments listed. [x] Progression has been slowed due to co-morbidities. [] Plan just implemented, too soon to assess goals progression <30days   [] Goals require adjustment due to lack of progress  [] Patient is not progressing as expected and requires additional follow up with physician  [] Other    Prognosis for POC: [x] Good [] Fair  [] Poor      Patient requires continued skilled intervention: [x] Yes  [] No    Treatment/Activity Tolerance:  [x] Patient able to complete treatment  [] Patient limited by fatigue  [] Patient limited by pain     [] Patient limited by other medical complications  [] Other: pt shows mild improvements with IR/ER today. He was able to progress tricep/bicep strengthening without pain. Continue to progress as tolerated.      Return to Play: (if applicable)   []  Stage 1: Intro to Strength   []  Stage 2: Return to Run and Strength   []  Stage 3: Return to Jump and Strength   []  Stage 4: Dynamic Strength and Agility   []  Stage 5: Sport Specific Training     []  Ready to Return to Play, Meets All Above Stages   []  Not Ready for Return to Sports   Comments:                               PLAN: See eval  [] Continue per plan of care [] Alter current plan (see comments above)  [x] Plan of care initiated [] Hold pending MD visit [] Discharge  Note: If patient does not return for scheduled/ recommended follow up visits, this note will serve as a discharge from care along with most recent update on progress. Reviewed insurance benefits for physical therapy in an outpatient hospital based setting with the patient, including deductible  and allowable visit number.  Pt was informed of possible out of pocket costs, as well as, informed of other service options for continuing supervised sessions without required skilled PT intervention such as the cash based [employer] program.     Electronically signed by:  Calixto Mcghee, PT, DPT, Cert DN

## 2022-04-01 ENCOUNTER — HOSPITAL ENCOUNTER (OUTPATIENT)
Dept: PHYSICAL THERAPY | Age: 47
Setting detail: THERAPIES SERIES
Discharge: HOME | End: 2022-04-01
Payer: COMMERCIAL

## 2022-04-01 PROCEDURE — 97110 THERAPEUTIC EXERCISES: CPT

## 2022-04-01 PROCEDURE — 97140 MANUAL THERAPY 1/> REGIONS: CPT

## 2022-04-01 NOTE — PLAN OF CARE
f  [facility], [place]    Physical Therapy Re-Certification Plan of Julio Lopez      Dear  Dr. Beatrice Thompson    We had the pleasure of treating the following patient for physical therapy services at [address] [place] [address]. A summary of our findings can be found in the updated assessment below. This includes our plan of care. If you have any questions or concerns regarding these findings, please do not hesitate to contact me at the office phone number checked above. Thank you for the referral.     Physician Signature:________________________________Date:__________________  By signing above (or electronic signature), therapists plan is approved by physician    Date Range Of Visits: 22-22  Total Visits to Date: 15  Overall Response to Treatment:   []Patient is responding well to treatment and improvement is noted with regards  to goals   []Patient should continue to improve in reasonable time if they continue HEP   []Patient has plateaued and is no longer responding to skilled PT intervention    []Patient is getting worse and would benefit from return to referring MD   []Patient unable to adhere to initial POC   []Other: pt is roughly 9.5 weeks sp R RTC repair. He notes ongoing pain in shoulder especially at night but is still significantly limited into both IR and ER ROM. He does admit today he does not perform stretches/HEP at home, has only been doing exercises when in PT. We had a haseeb discussion about importance of performing exercises and how this can help with his pain at night. Pt will continue to require skilled intervention for increasing ROM and strength in order to return to PLOF, 2x per week for 6-8 weeks.            Physical Therapy Daily Treatment Note  Date:  2022    Patient Name:  Slava Zayas    :  1975  MRN: [id number]  Medical/Treatment Diagnosis Information:  · Diagnosis: S46.011A Right Rotator cuff tear; sp RTC repair on 1/24/22  · Treatment Diagnosis: S46.011A Right Rotator cuff tear  Insurance/Certification information:  PT Insurance Information: [facility]  Physician Information:  Referring Practitioner: Jossie Kat  Has the plan of care been signed (Y/N):        []  Yes  [x]  No     Date of Patient follow up with Physician: 4/06/22      Is this a Progress Report:     []  Yes  [x]  No        Progress report will be due (10 Rx or 30 days whichever is less): 3/3/33       Recertification will be due (POC Duration  / 90 days whichever is less):          Visit # Insurance Allowable Auth Required   14 18 12/3/21-4/15/22 []  Yes []  No      Functional Scale: UEFI 9%      Date assessed:  4/1/22  Functional Scale: UEFI 17%     Date assessed:  3/4/22   Functional Scale: UEFI 100%     Date assessed:  1/28/22      Latex Allergy:  [x]NO      []YES  Preferred Language for Healthcare:   [x]English       []other:    Pain level: 0/10     SUBJECTIVE:  Pt is 9 week sp. Pt notes that he has not been sleeping well, he has to sleep in recliner to get any relief from discomfort. He does report that he does not do his stretches/HEP outside of his visits.      OBJECTIVE:   Observation:    Test measurements:      ROM PROM AROM  Comment    L R  03/30/22 L R  04/01/22    Flexion  160 wall slides    150    Abduction  150 wall slides   150    ER  65 cane   50; increased pain     IR  40 sleeper  46    Other        Other             Strength L R  04/01/22 Comment   Flexion  4    Abduction  4-; painful     ER  4-; discomfort     IR  4+    Supraspinatus      Upper Trap      Lower Trap      Mid Trap      Rhomboids      Biceps      Triceps      Horizontal Abduction      Horizontal Adduction      Lats          RESTRICTIONS/PRECAUTIONS: sp R RTC repair w/biceps tenodesis and subacromial decomp; see Letters 2/16; May progress Forward Elevation by 10 degrees weekly up to 140 and External Rotation 5 degrees weekly up to 40      Exercises:  Exercise/Equipment Resistance Repetitions Other comments   Stretching/PROM      Wand  ER supine 10x10           Pulleys  10x10 flex, scap     Wall slides   10x10 flex, abd     Pec Doorway  3x30 low V     Sleeper IR  10x10     Pendulum       PROM Elbow      Isometrics      Retraction            Weight shift      Flexion     Abduction     External Rotation     Internal Rotation      Biceps      Triceps            PRE's      Flexion      Abduction      External Rotation      Internal Rotation      Shrugs  3x10    EXT      Reverse Flys      Serratus      Horizontal Abd with ER      Biceps  3x10 red band ECL     Triceps  3x10 silver pull down     Retraction      UK deltoid      Cable Column/Theraband      External Rotation      Internal Rotation      Shrugs      Lats      Ext  3x10 black     Flex      Scapular Retraction  3x10 black      BIC      TRIC      PNF            Dynamic Stability            Plyoback                Therapeutic Exercise and NMR EXR  [] (65896) Provided verbal/tactile cueing for activities related to strengthening, flexibility, endurance, ROM  for improvements in scapular, scapulothoracic and UE control with self care, reaching, carrying, lifting, house/yardwork, driving/computer work.    [] (95970) Provided verbal/tactile cueing for activities related to improving balance, coordination, kinesthetic sense, posture, motor skill, proprioception  to assist with  scapular, scapulothoracic and UE control with self care, reaching, carrying, lifting, house/yardwork, driving/computer work. Therapeutic Activities:    [] (44673 or 78897) Provided verbal/tactile cueing for activities related to improving balance, coordination, kinesthetic sense, posture, motor skill, proprioception and motor activation to allow for proper function of scapular, scapulothoracic and UE control with self care, carrying, lifting, driving/computer work.      Home Exercise Program:    [x] (60112) Reviewed/Progressed HEP activities related to strengthening, flexibility, endurance, ROM of scapular, scapulothoracic and UE control with self care, reaching, carrying, lifting, house/yardwork, driving/computer work  [] (04613) Reviewed/Progressed HEP activities related to improving balance, coordination, kinesthetic sense, posture, motor skill, proprioception of scapular, scapulothoracic and UE control with self care, reaching, carrying, lifting, house/yardwork, driving/computer work      Manual Treatments:  PROM / STM / Oscillations-Mobs:  G-I, II, III, IV (PA's, Inf., Post.)  [] (58152) Provided manual therapy to mobilize soft tissue/joints of cervical/CT, scapular GHJ and UE for the purpose of modulating pain, promoting relaxation,  increasing ROM, reducing/eliminating soft tissue swelling/inflammation/restriction, improving soft tissue extensibility and allowing for proper ROM for normal function with self care, reaching, carrying, lifting, house/yardwork, driving/computer work    Instrument Assisted Soft Tissue Mobilization (IASTM): was applied to the following muscles: infraspinatus, proximal biceps, mid deltoid, with Hawk  tools including HG2 (handle-bar), HG4 (small can-opener), HG5 (medium can-opener), HG 8 (biscotti), and HG9 (tongue depressor). Treatment consisted of IASTM strokes including sweeping, fanning, brushing, strumming, filleting, pinning and framing, based on body region contours, nature of the soft tissue restriction and desired treatment outcomes. These techniques were used to restore neurophysiology, improve mechanotransduction, enhance fluid dynamics and break collagen crosslinks. The treatment area was exposed and the patient was draped in an appropriate manner. Upon completion the clinician cleaned the IASTM tools as per [facility] recommendations.    Skin check pre:   Skin check post:   Intermittent tx time:  15'    Modalities:      Charges: 200-245  Timed Code Treatment Minutes: 45   Total Treatment Minutes: 45     [] ALBINO (LOW) 78066 [] EVAL (MOD) 63258   [] EVAL (HIGH) 56700   [] RE-EVAL   [x] IJ(92320) x  2 (200-230, 30 )  [] IONTO  [] NMR (66396) x     [] VASO  [x] Manual (20127) x 1 (895-872, 15')  [] Other:  [] TA x    [] Mech Traction (19604)  [] ES(attended) (84295)      [] ES (un) (05240):       HEP instruction:   Access Code: [email or web address]  URL: [email or web address]  Date: 04/01/2022  Prepared by: Pedrito Olivera    Exercises  Shoulder Flexion Wall Slide with Towel - 1 x daily - 7 x weekly - 10 reps - 10 hold  Sleeper Stretch - 1 x daily - 7 x weekly - 10 reps - 10 hold  Supine Shoulder External Rotation in 45 Degrees Abduction AAROM with Dowel - 1 x daily - 7 x weekly - 10 reps - 10 hold  UK Deltoid Progam - 1 x daily - 7 x weekly - 3-5 minutes  Scapular Retraction with Resistance - 1 x daily - 7 x weekly - 3 sets - 10 reps  Standing Single Arm Elbow Flexion with Resistance - 1 x daily - 7 x weekly - 3 sets - 10 reps  Standing Isometric Shoulder Abduction with Doorway - Arm Bent - 1 x daily - 7 x weekly - 3 sets - 10 reps - 10 hold  Standing Isometric Shoulder External Rotation with Doorway - 1 x daily - 7 x weekly - 3 sets - 10 reps - 10 hold  Isometric Shoulder Flexion at Wall - 1 x daily - 7 x weekly - 3 sets - 10 reps - 10 hold      GOALS:  Patient stated goal: return to work duties without pain    [] Progressing: [] Met: [] Not Met: [] Adjusted    Therapist goals for Patient:   Short Term Goals: To be achieved in: 2-6 weeks  1. Independent in HEP and progression per patient tolerance, in order to prevent re-injury. [] Progressing: [] Met: [] Not Met: [] Adjusted   2. Patient will have a decrease in pain to facilitate improvement in movement, function, and ADLs as indicated by Functional Deficits. [x] Progressing: [] Met: [] Not Met: [] Adjusted   3. Patient will demonstrate increased AROM to 120+ flex, 60+ ERto allow for proper joint functioning as indicated by patients Functional Deficits.   6 weeks   [x] Progressing: [] Met: [] Not Met: [] Adjusted  4. Patient will return to ADLs such as bathing/dressing/grooming without increased symptoms or restriction. 6 weeks  [x] Progressing: [] Met: [] Not Met: [] Adjusted    Long Term Goals: To be achieved in: 12-16+ weeks  1. Disability index score of 20% or less for the UEFI to assist with reaching prior level of function. [] Progressing: [x] Met: [] Not Met: [] Adjusted  2. Patient will demonstrate increased AROM to 150+ flex, 80+ ERto allow for proper joint functioning as indicated by patients Functional Deficits. [x] Progressing: [] Met: [] Not Met: [] Adjusted  3. Patient will demonstrate an increase in Strength to 4/5 or greater to allow for proper functional mobility as indicated by patients Functional Deficits. [x] Progressing: [] Met: [] Not Met: [] Adjusted  4. Patient will return to reaching/lifting/carrying without increased symptoms or restriction. [x] Progressing: [] Met: [] Not Met: [] Adjusted  5. Patient will be able to lift/carry greater than 25lbs without increased symptoms or restriction in order to return to work duties. [x] Progressing: [] Met: [] Not Met: [] Adjusted    Overall Progression Towards Functional goals/ Treatment Progress Update:  [] Patient is progressing as expected towards functional goals listed. [x] Progression is slowed due to complexities/Impairments listed. [x] Progression has been slowed due to co-morbidities.   [] Plan just implemented, too soon to assess goals progression <30days   [] Goals require adjustment due to lack of progress  [] Patient is not progressing as expected and requires additional follow up with physician  [] Other    Prognosis for POC: [x] Good [] Fair  [] Poor      Patient requires continued skilled intervention: [x] Yes  [] No    Treatment/Activity Tolerance:  [x] Patient able to complete treatment  [] Patient limited by fatigue  [] Patient limited by pain     [] Patient limited by other medical complications  [] Other: pt exhibiting more pain with ER/IR today. Used iastm for pain control. Pt notes he does feel better following iastm. Reiterated importance of performing HEP in order to progress motion and strength. Return to Play: (if applicable)   []  Stage 1: Intro to Strength   []  Stage 2: Return to Run and Strength   []  Stage 3: Return to Jump and Strength   []  Stage 4: Dynamic Strength and Agility   []  Stage 5: Sport Specific Training     []  Ready to Return to Play, Meets All Above Stages   []  Not Ready for Return to Sports   Comments:                               PLAN: See eval  [] Continue per plan of care [] Alter current plan (see comments above)  [x] Plan of care initiated [] Hold pending MD visit [] Discharge  Note: If patient does not return for scheduled/ recommended follow up visits, this note will serve as a discharge from care along with most recent update on progress. Reviewed insurance benefits for physical therapy in an outpatient hospital based setting with the patient, including deductible  and allowable visit number.  Pt was informed of possible out of pocket costs, as well as, informed of other service options for continuing supervised sessions without required skilled PT intervention such as the cash based [employer] program.     Electronically signed by:  Zeus Torres, PT, DPT, Cert DN

## 2022-04-06 ENCOUNTER — APPOINTMENT (OUTPATIENT)
Dept: PHYSICAL THERAPY | Age: 47
End: 2022-04-06
Payer: COMMERCIAL

## 2022-04-06 ENCOUNTER — OFFICE VISIT (OUTPATIENT)
Dept: ORTHOPEDIC SURGERY | Age: 47
End: 2022-04-06

## 2022-04-06 VITALS — WEIGHT: 263 LBS | HEIGHT: 75 IN | BODY MASS INDEX: 32.7 KG/M2

## 2022-04-06 DIAGNOSIS — Z98.890 S/P RIGHT ROTATOR CUFF REPAIR: Primary | ICD-10-CM

## 2022-04-06 PROCEDURE — 99024 POSTOP FOLLOW-UP VISIT: CPT | Performed by: ORTHOPAEDIC SURGERY

## 2022-04-06 RX ORDER — MELOXICAM 15 MG/1
TABLET ORAL
COMMUNITY
Start: 2022-03-17

## 2022-04-06 NOTE — LETTER
Shoulder Elbow Rehabilitation Referral    Patient Name: Seth Miramontes      YOB: 1975    Diagnosis:   1. S/P right rotator cuff repair        Precautions:   Post Op Instructions:  [x] Continuous passive motion (CPM)  [x] Elbow range of motion  [x] Exercise in plane of scapula   []  Strengthening     [] Pulley and instruction    [x] Home exercise program (copy to patient)   [] Sling when arm at risk  [] Sling or brace at all times   [x] AAROM: Forward elevation to Full           [x] AAROM: External rotation to Full  [] Isometric external rotator strengthening [x] AAROM: internal rotation: up the back  [x] Isometric abductor strengthening  [x] AAROM: Internal abduction     [] Isometric internal rotator strengthening [x] AAROM: cross-body adduction             Stretching:     Strengthening:  [x] Four quadrant (FE, ER, IR, CBA)  [] Rotator cuff (ER, IR, Abd)  [x] Forward Elevation    [] External Rotators     [x] External Rotation    [] Internal Rotators  [x] Internal Rotation: up/back   [] Abductors     [x] Internal Rotation: supine in abduction  [] Flexors  [x] Cross-body abduction    [] Extensors  [x] Pendulum (FE, Abd/Add, cw/ccw)  [x] Scapular Stabilizers   [x] Wall-walking (FE, Abd)    [x] Shoulder shrugs     [x] Table slides      [x] Rhomboid pinch  [] Elbow (flex, ext, pron, sup)    [] Lat.  Pull downs     [] Medial epicondylitis program    [] Forward punch   [] Lateral epicondylitis program    [] Internal rotators     [] Progressive resistive exercises  [] Bench Press        [] Bench press plus  Activities:     [] Lateral pull-downs  [x] Rowing     [] Progressive two-hand supine press  [] Stepper/Exercise bike   [x] Biceps/triceps curls/supination  [] Swimming  [] Water exercises    Modalities: PRN    Return to Sport:  [] Ultrasound     [] Plyometrics  [] Iontophoresis     [] Rhythmic stabilization  [] Moist heat     [] Core strengthening   [] Massage     [] Sports specific program:   [x] Cryotherapy      [] Electrical stimulation     [] Paraffin  [] Whirlpool  [] TENS    [x] Home exercise program (copy to patient). Perform exercises for:   15     minutes    2-3      times/day  [x] Supervised physical therapy  Frequency: []  1x week  [x] 2x week  [] 3x week  [] Other:   Duration: [] 2 weeks   [] 4 weeks  [x] 6 weeks  [] Other:     Additional Instructions:   Rotator cuff strengthening at 12-13 weeks postop  Fine tune motion       Ike Crowley MD, PhD

## 2022-04-06 NOTE — PROGRESS NOTES
Chief Complaint    Shoulder Pain (F/U RIGHT SHOULDER)      History of Present Illness:  Erin Sneed is a pleasant, 41 y.o., male, here today for follow up of his right shoulder. He is 10 weeks out following a right shoulder arthroscopic subacromial decompression, rotator cuff repair, open subpectoral biceps tenodesis on 1/24/22. He has continued in physical therapy at the [facility]. He feels his shoulder is trending in the right direction. He does struggle with pain at night which disrupts his sleep, however pain levels throughout the day are reasonable. He reports no new injuries or setbacks. Pain Assessment  Location of Pain: Shoulder  Location Modifiers: Right  Severity of Pain: 0  Frequency of Pain: Intermittent  Aggravating Factors: Other (Comment)  Limiting Behavior: No  Relieving Factors: Rest,Exercise  Work-Related Injury: No  Are there other pain locations you wish to document?: No      Medical History:  Patient's medications, allergies, past medical, surgical, social and family histories were reviewed and updated as appropriate. No notes on file    Review of Systems  A 14 point review of systems was completed by the patient on 10/12/21 and is available in the media section of the scanned medical record and was reviewed on 4/6/2022. The review is negative with the exception of those things mentioned in the HPI and Past Medical History    Vital Signs: There were no vitals filed for this visit. General/Appearance: Alert and oriented and in no apparent distress. Skin:  There are no skin lesions, cellulitis, or extreme edema. The patient has warm and well-perfused Bilateral upper extremities with brisk capillary refill. Right Shoulder Exam:  Inspection: Incision portals are healing well. No gross deformities, no signs of infection. Palpation: Non-tender to palpate    Active Range of Motion:   Forward Elevation 150 with elbow flexed, Internal Rotation L3 vs T12 on the left    Passive Range of Motion: Deferred    Strength:  External Rotation 4/5    Special Tests: No Daljit muscle deformity. Neurovascular: Sensation to light touch is intact, no motor deficits, palpable radial pulses 2+      Radiology:     No new XR obtained at this time. Assessment :  Mr. Clover Cadet is a pleasant, 41 y.o. patient who is 10 weeks out following a right shoulder arthroscopic subacromial decompression, rotator cuff repair, open subpectoral biceps tenodesis on 1/24/22. He is on track with his postoperative recovery. Impression:  Encounter Diagnosis   Name Primary?  S/P right rotator cuff repair Yes       Office Procedures:  Orders Placed This Encounter   Procedures   [address]     Referral Priority:   Routine     Referral Type:   Eval and Treat     Referral Reason:   Specialty Services Required     Requested Specialty:   Physical Therapy     Number of Visits Requested:   1       Treatment Plan:  Clover Cadet is doing well in his recovery. We recommend this patient continue in physical therapy. A new physical therapy letter was documented in [facility] today. We will have him begin strengthening phase of rehab in the next few weeks. He will also continue in therapy working on motion. We discussed current restrictions. We will see Burke Peterson back in 6 weeks and/or as needed. All questions were answered to patient's satisfaction and He was encouraged to call with any further questions or concerns. Jayce Rothman is in agreement with this plan. 4/6/2022  10:10 AM    Edgar Armstrong [facility]  Athletic [address]    During this examination, I, Chikis Monroe, functioned as a scribe for Dr. Anson Hernandez. The history taking and physical examination were performed by Dr. Michael Clemente. All counseling during the appointment was performed between the patient and Dr. Michael Clemente. 4/6/22  ______________  I, Dr. Glory Rowland, personally performed the services described in this documentation as described by Vaughn Wesley ATC in my presence, and it is both accurate and complete. Ike Riddle MD, PhD  4/6/2022

## 2022-04-08 ENCOUNTER — HOSPITAL ENCOUNTER (OUTPATIENT)
Dept: PHYSICAL THERAPY | Age: 47
Setting detail: THERAPIES SERIES
Discharge: HOME | End: 2022-04-08
Payer: COMMERCIAL

## 2022-04-08 PROCEDURE — 97110 THERAPEUTIC EXERCISES: CPT

## 2022-04-08 PROCEDURE — 97530 THERAPEUTIC ACTIVITIES: CPT

## 2022-04-08 NOTE — FLOWSHEET NOTE
f  [facility], [place]       Physical Therapy Daily Treatment Note  Date:  2022    Patient Name:  Slava Zayas    :  1975  MRN: [id number]  Medical/Treatment Diagnosis Information:  · Diagnosis: S46.011A Right Rotator cuff tear; sp RTC repair on 22  · Treatment Diagnosis: S46.011A Right Rotator cuff tear  Insurance/Certification information:  PT Insurance Information: [facility]  Physician Information:  Referring Practitioner: Beatrice Thompson  Has the plan of care been signed (Y/N):        []  Yes  [x]  No     Date of Patient follow up with Physician: 22      Is this a Progress Report:     []  Yes  [x]  No        Progress report will be due (10 Rx or 30 days whichever is less): 37       Recertification will be due (POC Duration  / 90 days whichever is less):          Visit # Insurance Allowable Auth Required   15 18 12/3/21-4/15/22 []  Yes []  No      Functional Scale: UEFI 9%      Date assessed:  22  Functional Scale: UEFI 17%     Date assessed:  3/4/22   Functional Scale: UEFI 100%     Date assessed:  22      Latex Allergy:  [x]NO      []YES  Preferred Language for Healthcare:   [x]English       []other:    Pain level: 0/10     SUBJECTIVE:  Pt is 10 week sp. Pt saw MD who notes he is doing well. Pt reports he has been more diligent about home exercises and he has been feeling better. He slept through the night the last few nights without issue.    OBJECTIVE:   Observation:    Test measurements:      ROM PROM AROM  Comment    L R  22 L R  22    Flexion  165 wall slides    150    Abduction  150 wall slides   150    ER  70 cane   50; increased pain     IR  46 sleeper  46    Other        Other             Strength L R  22 Comment   Flexion  4    Abduction  4-; painful     ER  4-; discomfort     IR  4+    Supraspinatus      Upper Trap      Lower Trap      Mid Trap      Rhomboids      Biceps      Triceps      Horizontal Abduction      Horizontal Adduction      Lats          RESTRICTIONS/PRECAUTIONS: sp R RTC repair w/biceps tenodesis and subacromial decomp; see Letters 2/16; May progress Forward Elevation by 10 degrees weekly up to 140 and External Rotation 5 degrees weekly up to 40      Exercises:  Exercise/Equipment Resistance Repetitions Other comments   Stretching/PROM      Wand  ER supine 10x10           Pulleys  10x10 flex, scap     Wall slides   10x10 flex, abd     Pec Doorway       Sleeper IR  10x10     Pendulum       PROM Elbow      Isometrics      Retraction            Weight shift      Flexion     Abduction     External Rotation     Internal Rotation      Biceps      Triceps            PRE's      Flexion      Abduction      External Rotation  3x10 SL 0#     Internal Rotation      Shrugs      EXT      Reverse Flys      Serratus      Horizontal Abd with ER      Biceps  3x10 red band ECL     Triceps  3x10 silver pull down     Retraction      UK deltoid  5 min  painfree arc 1#    Cable Column/Theraband      External Rotation      Internal Rotation      Shrugs      Lats      Ext  3x10 black     Flex      Scapular Retraction  3x10 black      BIC      TRIC      PNF            Dynamic Stability            Plyoback                Therapeutic Exercise and NMR EXR  [] (17950) Provided verbal/tactile cueing for activities related to strengthening, flexibility, endurance, ROM  for improvements in scapular, scapulothoracic and UE control with self care, reaching, carrying, lifting, house/yardwork, driving/computer work.    [] (06781) Provided verbal/tactile cueing for activities related to improving balance, coordination, kinesthetic sense, posture, motor skill, proprioception  to assist with  scapular, scapulothoracic and UE control with self care, reaching, carrying, lifting, house/yardwork, driving/computer work.     Therapeutic Activities:    [] (18196 or 54966) Provided verbal/tactile cueing for activities related to improving balance, coordination, kinesthetic sense, posture, motor skill, proprioception and motor activation to allow for proper function of scapular, scapulothoracic and UE control with self care, carrying, lifting, driving/computer work. Home Exercise Program:    [x] (17214) Reviewed/Progressed HEP activities related to strengthening, flexibility, endurance, ROM of scapular, scapulothoracic and UE control with self care, reaching, carrying, lifting, house/yardwork, driving/computer work  [] (47353) Reviewed/Progressed HEP activities related to improving balance, coordination, kinesthetic sense, posture, motor skill, proprioception of scapular, scapulothoracic and UE control with self care, reaching, carrying, lifting, house/yardwork, driving/computer work      Manual Treatments:  PROM / STM / Oscillations-Mobs:  G-I, II, III, IV (PA's, Inf., Post.)  [] (82105) Provided manual therapy to mobilize soft tissue/joints of cervical/CT, scapular GHJ and UE for the purpose of modulating pain, promoting relaxation,  increasing ROM, reducing/eliminating soft tissue swelling/inflammation/restriction, improving soft tissue extensibility and allowing for proper ROM for normal function with self care, reaching, carrying, lifting, house/yardwork, driving/computer work        Modalities:      Charges: 200-245  Timed Code Treatment Minutes: 45   Total Treatment Minutes: 45     [] EVAL (LOW) 87186   [] EVAL (MOD) 83393   [] EVAL (HIGH) 09462   [] RE-EVAL    [x] OV(19133) x  2 (200-230, 30 )  [] IONTO  [] NMR (82839) x     [] VASO  [] Manual (31405) x   [] Other:  [x] TA x 1 (822-625, 15')    [] [facility] (98762)  [] ES(attended) (67013)      [] ES (un) (97765):       HEP instruction:   Access Code: [email or web address]  URL: [email or web address]  Date: 04/01/2022  Prepared by: Moisés Sanchez    Exercises  Shoulder Flexion Wall Slide with Towel - 1 x daily - 7 x weekly - 10 reps - 10 hold  Sleeper Stretch - 1 x daily - 7 x weekly - 10 reps - 10 hold  Supine Shoulder External Rotation in 45 Degrees Abduction AAROM with Dowel - 1 x daily - 7 x weekly - 10 reps - 10 hold  UK Deltoid Progam - 1 x daily - 7 x weekly - 3-5 minutes  Scapular Retraction with Resistance - 1 x daily - 7 x weekly - 3 sets - 10 reps  Standing Single Arm Elbow Flexion with Resistance - 1 x daily - 7 x weekly - 3 sets - 10 reps  Standing Isometric Shoulder Abduction with Doorway - Arm Bent - 1 x daily - 7 x weekly - 3 sets - 10 reps - 10 hold  Standing Isometric Shoulder External Rotation with Doorway - 1 x daily - 7 x weekly - 3 sets - 10 reps - 10 hold  Isometric Shoulder Flexion at Wall - 1 x daily - 7 x weekly - 3 sets - 10 reps - 10 hold      GOALS:  Patient stated goal: return to work duties without pain    [] Progressing: [] Met: [] Not Met: [] Adjusted    Therapist goals for Patient:   Short Term Goals: To be achieved in: 2-6 weeks  1. Independent in HEP and progression per patient tolerance, in order to prevent re-injury. [] Progressing: [] Met: [] Not Met: [] Adjusted   2. Patient will have a decrease in pain to facilitate improvement in movement, function, and ADLs as indicated by Functional Deficits. [x] Progressing: [] Met: [] Not Met: [] Adjusted   3. Patient will demonstrate increased AROM to 120+ flex, 60+ ERto allow for proper joint functioning as indicated by patients Functional Deficits. 6 weeks   [x] Progressing: [] Met: [] Not Met: [] Adjusted  4. Patient will return to ADLs such as bathing/dressing/grooming without increased symptoms or restriction. 6 weeks  [x] Progressing: [] Met: [] Not Met: [] Adjusted    Long Term Goals: To be achieved in: 12-16+ weeks  1. Disability index score of 20% or less for the UEFI to assist with reaching prior level of function. [] Progressing: [x] Met: [] Not Met: [] Adjusted  2. Patient will demonstrate increased AROM to 150+ flex, 80+ ERto allow for proper joint functioning as indicated by patients Functional Deficits. [x] Progressing: [] Met: [] Not Met: [] Adjusted  3. Patient will demonstrate an increase in Strength to 4/5 or greater to allow for proper functional mobility as indicated by patients Functional Deficits. [x] Progressing: [] Met: [] Not Met: [] Adjusted  4. Patient will return to reaching/lifting/carrying without increased symptoms or restriction. [x] Progressing: [] Met: [] Not Met: [] Adjusted  5. Patient will be able to lift/carry greater than 25lbs without increased symptoms or restriction in order to return to work duties. [x] Progressing: [] Met: [] Not Met: [] Adjusted    Overall Progression Towards Functional goals/ Treatment Progress Update:  [] Patient is progressing as expected towards functional goals listed. [x] Progression is slowed due to complexities/Impairments listed. [x] Progression has been slowed due to co-morbidities. [] Plan just implemented, too soon to assess goals progression <30days   [] Goals require adjustment due to lack of progress  [] Patient is not progressing as expected and requires additional follow up with physician  [] Other    Prognosis for POC: [x] Good [] Fair  [] Poor      Patient requires continued skilled intervention: [x] Yes  [] No    Treatment/Activity Tolerance:  [x] Patient able to complete treatment  [] Patient limited by fatigue  [] Patient limited by pain     [] Patient limited by other medical complications  [] Other: pt able to progress light RTC PREs today and increase ROM.  Continue to progress as tolerated       Return to Play: (if applicable)   []  Stage 1: Intro to Strength   []  Stage 2: Return to Run and Strength   []  Stage 3: Return to Jump and Strength   []  Stage 4: Dynamic Strength and Agility   []  Stage 5: Sport Specific Training     []  Ready to Return to Play, Meets All Above Stages   []  Not Ready for Return to Sports   Comments:                               PLAN: See eval  [] Continue per plan of care [] Alter current plan (see comments above)  [x] Plan of care initiated [] Hold pending MD visit [] Discharge  Note: If patient does not return for scheduled/ recommended follow up visits, this note will serve as a discharge from care along with most recent update on progress. Reviewed insurance benefits for physical therapy in an outpatient hospital based setting with the patient, including deductible  and allowable visit number.  Pt was informed of possible out of pocket costs, as well as, informed of other service options for continuing supervised sessions without required skilled PT intervention such as the [facility] program.     Electronically signed by:  Tim Moseley, PT, DPT, Cert DN

## 2022-04-13 ENCOUNTER — HOSPITAL ENCOUNTER (OUTPATIENT)
Dept: PHYSICAL THERAPY | Age: 47
Setting detail: THERAPIES SERIES
Discharge: HOME | End: 2022-04-13
Payer: COMMERCIAL

## 2022-04-13 NOTE — FLOWSHEET NOTE
[id number] [facility]     Physical Therapy  Cancellation/No-show Note  Patient Name:  Lavonne Michelle  :  1975   Date:  2022  Cancelled visits to date:   No-shows to date: 0    For today's appointment patient:  [x]  Cancelled  []  Rescheduled appointment  []  No-show     Reason given by patient:  []  Patient ill  []  Conflicting appointment  []  No transportation    []  Conflict with work  [x]  No reason given  []  Other:     Comments:      Electronically signed by:  Stone Guerra, PT, PT

## 2022-04-15 ENCOUNTER — HOSPITAL ENCOUNTER (OUTPATIENT)
Dept: PHYSICAL THERAPY | Age: 47
Setting detail: THERAPIES SERIES
Discharge: HOME | End: 2022-04-15
Payer: COMMERCIAL

## 2022-04-15 PROCEDURE — 97530 THERAPEUTIC ACTIVITIES: CPT

## 2022-04-15 PROCEDURE — 97110 THERAPEUTIC EXERCISES: CPT

## 2022-04-15 NOTE — FLOWSHEET NOTE
f  [facility], [place]       Physical Therapy Daily Treatment Note  Date:  4/15/2022    Patient Name:  Davin Torres    :  1975  MRN: [id number]  Medical/Treatment Diagnosis Information:  · Diagnosis: S46.011A Right Rotator cuff tear; sp RTC repair on 22  · Treatment Diagnosis: S46.011A Right Rotator cuff tear  Insurance/Certification information:  PT Insurance Information: [facility]  Physician Information:  Referring Practitioner: Franco Crowley  Has the plan of care been signed (Y/N):        []  Yes  [x]  No     Date of Patient follow up with Physician: 22      Is this a Progress Report:     []  Yes  [x]  No        Progress report will be due (10 Rx or 30 days whichever is less):        Recertification will be due (POC Duration  / 90 days whichever is less):          Visit # Insurance Allowable Auth Required   16 18 12/3/21-4/15/22 []  Yes []  No      Functional Scale: UEFI 9%      Date assessed:  22  Functional Scale: UEFI 17%     Date assessed:  3/4/22   Functional Scale: UEFI 100%     Date assessed:  22      Latex Allergy:  [x]NO      []YES  Preferred Language for Healthcare:   [x]English       []other:    Pain level: 0/10     SUBJECTIVE:  Pt is 11 week sp. He notes that during the day he is painfree and has been doing well with lifting/carrying things cautiously.  He does still note pain at night that is waking him but he stopped taking meloxicam.       OBJECTIVE:   Observation:    Test measurements:      ROM PROM AROM  Comment    L R  04/15/22    L R  22    Flexion      Abduction      ER  75 cane      IR  46 sleeper     Other        Other             Strength L R  22 Comment   Flexion     Abduction     ER     IR     Supraspinatus      Upper Trap      Lower Trap      Mid Trap      Rhomboids      Biceps      Triceps      Horizontal Abduction      Horizontal Adduction      Lats          RESTRICTIONS/PRECAUTIONS: sp R RTC repair w/biceps tenodesis and subacromial decomp; see Letters 2/16; May progress Forward Elevation by 10 degrees weekly up to 140 and External Rotation 5 degrees weekly up to 40      Exercises:  Exercise/Equipment Resistance Repetitions Other comments   Stretching/PROM      Wand  ER supine 10x10           Pulleys  10x10 flex, scap     Wall slides   10x10 flex, abd     Pec Doorway       Sleeper IR  10x10     Pendulum       PROM Elbow      Isometrics      Retraction            Weight shift      Flexion     Abduction     External Rotation     Internal Rotation      Biceps      Triceps            PRE's      Flexion      Abduction      External Rotation      Internal Rotation      Shrugs      EXT      Reverse Flys      Serratus      Horizontal Abd with ER      Biceps  Reviewed for HEP   Triceps  Reviewed for HEP    Retraction      UK deltoid  5 min  painfree arc 1#    Cable Column/Theraband      External Rotation  3x10 red     Internal Rotation  3x10 black     Shrugs      Lats      Ext  3x10 black     Flex      Scapular Retraction  3x10 black      BIC      TRIC      PNF            Dynamic Stability      Body blade   Consider NV? BOW  x30 cw/ccw    Plyoback                Therapeutic Exercise and NMR EXR  [] (60050) Provided verbal/tactile cueing for activities related to strengthening, flexibility, endurance, ROM  for improvements in scapular, scapulothoracic and UE control with self care, reaching, carrying, lifting, house/yardwork, driving/computer work.    [] (55223) Provided verbal/tactile cueing for activities related to improving balance, coordination, kinesthetic sense, posture, motor skill, proprioception  to assist with  scapular, scapulothoracic and UE control with self care, reaching, carrying, lifting, house/yardwork, driving/computer work.     Therapeutic Activities:    [] (96560 or 21342) Provided verbal/tactile cueing for activities related to improving balance, coordination, kinesthetic sense, posture, motor skill, proprioception and motor activation to allow for proper function of scapular, scapulothoracic and UE control with self care, carrying, lifting, driving/computer work. Home Exercise Program:    [x] (56270) Reviewed/Progressed HEP activities related to strengthening, flexibility, endurance, ROM of scapular, scapulothoracic and UE control with self care, reaching, carrying, lifting, house/yardwork, driving/computer work  [] (11799) Reviewed/Progressed HEP activities related to improving balance, coordination, kinesthetic sense, posture, motor skill, proprioception of scapular, scapulothoracic and UE control with self care, reaching, carrying, lifting, house/yardwork, driving/computer work      Manual Treatments:  PROM / STM / Oscillations-Mobs:  G-I, II, III, IV (PA's, Inf., Post.)  [] (76075) Provided manual therapy to mobilize soft tissue/joints of cervical/CT, scapular GHJ and UE for the purpose of modulating pain, promoting relaxation,  increasing ROM, reducing/eliminating soft tissue swelling/inflammation/restriction, improving soft tissue extensibility and allowing for proper ROM for normal function with self care, reaching, carrying, lifting, house/yardwork, driving/computer work        Modalities:      Charges: 200-245  Timed Code Treatment Minutes: 45   Total Treatment Minutes: 45     [] EVAL (LOW) 75311   [] EVAL (MOD) 45500   [] EVAL (HIGH) 26744   [] RE-EVAL    [x] LA(58689) x  1 (200-215, 15 )  [] IONTO  [] NMR (80511) x     [] VASO  [] Manual (89290) x   [] Other:  [x] TA x 2 (215-245, 30')    [] Mech Traction (40216)  [] ES(attended) (40171)      [] ES (un) (74971):       HEP instruction:   Access Code: [email or web address]  URL: [email or web address]  Date: 04/15/2022  Prepared by: Jennifer Deutsch    Exercises  Shoulder Flexion Wall Slide with Towel - 1 x daily - 7 x weekly - 10 reps - 10 hold  Sleeper Stretch - 1 x daily - 7 x weekly - 10 reps - 10 hold  Supine Shoulder External Rotation in 45 Degrees Abduction AAROM with Dowel - 1 x daily - 7 x weekly - 10 reps - 10 hold  UK Deltoid Progam - 1 x daily - 7 x weekly - 3-5 minutes  Scapular Retraction with Resistance - 1 x daily - 3-4 x weekly - 3 sets - 10 reps  Standing Bent Over Triceps Extension - 1 x daily - 3-4 x weekly - 3 sets - 10 reps  Staggered Stance Biceps Curl - 1 x daily - 3-4 x weekly - 3 sets - 10 reps  Shoulder External Rotation with Anchored Resistance - 1 x daily - 3-4 x weekly - 3 sets - 10 reps  Shoulder Internal Rotation with Resistance - 1 x daily - 3-4 x weekly - 3 sets - 10 reps  Standing Wall Ball Circles with Mini Swiss Ball - 1 x daily - 3-4 x weekly - 3 sets - 10 reps        GOALS:  Patient stated goal: return to work duties without pain    [] Progressing: [] Met: [] Not Met: [] Adjusted    Therapist goals for Patient:   Short Term Goals: To be achieved in: 2-6 weeks  1. Independent in HEP and progression per patient tolerance, in order to prevent re-injury. [] Progressing: [] Met: [] Not Met: [] Adjusted   2. Patient will have a decrease in pain to facilitate improvement in movement, function, and ADLs as indicated by Functional Deficits. [x] Progressing: [] Met: [] Not Met: [] Adjusted   3. Patient will demonstrate increased AROM to 120+ flex, 60+ ERto allow for proper joint functioning as indicated by patients Functional Deficits. 6 weeks   [x] Progressing: [] Met: [] Not Met: [] Adjusted  4. Patient will return to ADLs such as bathing/dressing/grooming without increased symptoms or restriction. 6 weeks  [x] Progressing: [] Met: [] Not Met: [] Adjusted    Long Term Goals: To be achieved in: 12-16+ weeks  1. Disability index score of 20% or less for the UEFI to assist with reaching prior level of function. [] Progressing: [x] Met: [] Not Met: [] Adjusted  2. Patient will demonstrate increased AROM to 150+ flex, 80+ ERto allow for proper joint functioning as indicated by patients Functional Deficits.     [x] Progressing: [] applicable)   []  Stage 1: Intro to Strength   []  Stage 2: Return to Run and Strength   []  Stage 3: Return to Jump and Strength   []  Stage 4: Dynamic Strength and Agility   []  Stage 5: Sport Specific Training     []  Ready to Return to Play, Meets All Above Stages   []  Not Ready for Return to Sports   Comments:                               PLAN: See eval  [] Continue per plan of care [] Alter current plan (see comments above)  [x] Plan of care initiated [] Hold pending MD visit [] Discharge  Note: If patient does not return for scheduled/ recommended follow up visits, this note will serve as a discharge from care along with most recent update on progress. Reviewed insurance benefits for physical therapy in an outpatient hospital based setting with the patient, including deductible  and allowable visit number.  Pt was informed of possible out of pocket costs, as well as, informed of other service options for continuing supervised sessions without required skilled PT intervention such as the cash based [employer] program.     Electronically signed by:  Moisés Sanchez, PT, DPT, Cert DN

## 2022-04-19 ENCOUNTER — HOSPITAL ENCOUNTER (OUTPATIENT)
Dept: PHYSICAL THERAPY | Age: 47
Setting detail: THERAPIES SERIES
Discharge: HOME | End: 2022-04-19
Payer: COMMERCIAL

## 2022-04-19 PROCEDURE — 97110 THERAPEUTIC EXERCISES: CPT

## 2022-04-19 PROCEDURE — 97530 THERAPEUTIC ACTIVITIES: CPT

## 2022-04-19 NOTE — FLOWSHEET NOTE
f  [facility], [place]       Physical Therapy Daily Treatment Note  Date:  2022    Patient Name:  Susan Beck    :  1975  MRN: [id number]  Medical/Treatment Diagnosis Information:  · Diagnosis: S46.011A Right Rotator cuff tear; sp RTC repair on 22  · Treatment Diagnosis: S46.011A Right Rotator cuff tear  Insurance/Certification information:  PT Insurance Information: [employer] [facility]  Physician Information:  Referring Practitioner: Ama Vazquez  Has the plan of care been signed (Y/N):        []  Yes  [x]  No     Date of Patient follow up with Physician: 22      Is this a Progress Report:     []  Yes  [x]  No        Progress report will be due (10 Rx or 30 days whichever is less): 3/4/11       Recertification will be due (POC Duration  / 90 days whichever is less):          Visit # Insurance Allowable Auth Required   16  1 18 12/3/21-4/15/22  12 22-22 []  Yes []  No      Functional Scale: UEFI 9%      Date assessed:  22  Functional Scale: UEFI 17%     Date assessed:  3/4/22   Functional Scale: UEFI 100%     Date assessed:  22      Latex Allergy:  [x]NO      []YES  Preferred Language for Healthcare:   [x]English       []other:    Pain level: 0/10     SUBJECTIVE:  Pt is 12 week sp. Pt reports good tolerance to previous session without significant increase in baseline symptoms reported. Currently, pt reports 0/10 pain, however, 6-7/10 pain is reported at night and is causing sleep disturbances 5-6 times per night. Pt continues to admit that they are not taking meloxicam or icing at night to improve symptoms.          OBJECTIVE:   Observation:    Test measurements:      ROM PROM AROM  Comment    L R  22    L R  22    Flexion      Abduction      ER  75 cane   65 in supine    IR  56 sleeper     Other        Other             Strength L R  22 Comment   Flexion     Abduction     ER     IR     Supraspinatus      Upper Trap      Lower Trap      Mid Trap Rhomboids      Biceps      Triceps      Horizontal Abduction      Horizontal Adduction      Lats          RESTRICTIONS/PRECAUTIONS: sp R RTC repair w/biceps tenodesis and subacromial decomp; see Letters 2/16; May progress Forward Elevation by 10 degrees weekly up to 140 and External Rotation 5 degrees weekly up to 40      Exercises:  Exercise/Equipment Resistance Repetitions Other comments   Stretching/PROM      Wand  ER supine 10x10           Pulleys  10x10 flex, scap     Wall slides   10x10 flex, abd     Pec Doorway       Sleeper IR  10x10     Pendulum       PROM Elbow      Isometrics      Retraction            Weight shift      Flexion     Abduction     External Rotation     Internal Rotation      Biceps      Triceps            PRE's      Flexion      Abduction      External Rotation      Internal Rotation      Prone Row  3#  2x10     Shrugs      EXT      Reverse Flys      Serratus A/A Dowel+4#  2x10     Horizontal Abd with ER      Biceps  Reviewed for HEP   Triceps  Reviewed for HEP    Retraction      UK deltoid  5 min  painfree arc 1#    Cable Column/Theraband      External Rotation  3x10 red     Internal Rotation  3x10 black  4/19-ECC focus    Shrugs      Lats      Ext  3x10 black     Flex      Scapular Retraction      BIC      TRIC      PNF            Dynamic Stability      Body blade   Consider NV?    BOW  x30 cw/ccw    Plyoback                Therapeutic Exercise and NMR EXR  [x] (30970) Provided verbal/tactile cueing for activities related to strengthening, flexibility, endurance, ROM  for improvements in scapular, scapulothoracic and UE control with self care, reaching, carrying, lifting, house/yardwork, driving/computer work.    [] (58484) Provided verbal/tactile cueing for activities related to improving balance, coordination, kinesthetic sense, posture, motor skill, proprioception  to assist with  scapular, scapulothoracic and UE control with self care, reaching, carrying, lifting, house/yardwork, driving/computer work. Therapeutic Activities:    [x] (21793 or 37631) Provided verbal/tactile cueing for activities related to improving balance, coordination, kinesthetic sense, posture, motor skill, proprioception and motor activation to allow for proper function of scapular, scapulothoracic and UE control with self care, carrying, lifting, driving/computer work. Home Exercise Program:    [x] (86445) Reviewed/Progressed HEP activities related to strengthening, flexibility, endurance, ROM of scapular, scapulothoracic and UE control with self care, reaching, carrying, lifting, house/yardwork, driving/computer work  [] (10353) Reviewed/Progressed HEP activities related to improving balance, coordination, kinesthetic sense, posture, motor skill, proprioception of scapular, scapulothoracic and UE control with self care, reaching, carrying, lifting, house/yardwork, driving/computer work      Manual Treatments:  PROM / STM / Oscillations-Mobs:  G-I, II, III, IV (PA's, Inf., Post.)  [] (74602) Provided manual therapy to mobilize soft tissue/joints of cervical/CT, scapular GHJ and UE for the purpose of modulating pain, promoting relaxation,  increasing ROM, reducing/eliminating soft tissue swelling/inflammation/restriction, improving soft tissue extensibility and allowing for proper ROM for normal function with self care, reaching, carrying, lifting, house/yardwork, driving/computer work        Modalities:      Charges: 4383-6532  Timed Code Treatment Minutes: 60   Total Treatment Minutes: 62     [] EVAL (LOW) 62456   [] EVAL (MOD) 51221   [] EVAL (HIGH) 27792   [] RE-EVAL    [x] VH(97552) x  2 (6933-4091, 25 )  [] IONTO  [] NMR (35370) x     [] VASO  [] Manual (59128) x   [] Other:  [x] TA x 2 (4988-3215, 35')    [] Mech Traction (74874)  [] ES(attended) (70851)      [] ES (un) (08440):       HEP instruction:   Access Code: [email or web address]  URL: [email or web address]  Date: 04/15/2022  Prepared by: [facility] [place]    Exercises  Shoulder Flexion Wall Slide with Towel - 1 x daily - 7 x weekly - 10 reps - 10 hold  Sleeper Stretch - 1 x daily - 7 x weekly - 10 reps - 10 hold  Supine Shoulder External Rotation in 45 Degrees Abduction AAROM with Dowel - 1 x daily - 7 x weekly - 10 reps - 10 hold  UK Deltoid Progam - 1 x daily - 7 x weekly - 3-5 minutes  Scapular Retraction with Resistance - 1 x daily - 3-4 x weekly - 3 sets - 10 reps  Standing Bent Over Triceps Extension - 1 x daily - 3-4 x weekly - 3 sets - 10 reps  Staggered Stance Biceps Curl - 1 x daily - 3-4 x weekly - 3 sets - 10 reps  Shoulder External Rotation with Anchored Resistance - 1 x daily - 3-4 x weekly - 3 sets - 10 reps  Shoulder Internal Rotation with Resistance - 1 x daily - 3-4 x weekly - 3 sets - 10 reps  Standing Wall Ball Circles with Mini Swiss Ball - 1 x daily - 3-4 x weekly - 3 sets - 10 reps        GOALS:  Patient stated goal: return to work duties without pain    [] Progressing: [] Met: [] Not Met: [] Adjusted    Therapist goals for Patient:   Short Term Goals: To be achieved in: 2-6 weeks  1. Independent in HEP and progression per patient tolerance, in order to prevent re-injury. [] Progressing: [] Met: [] Not Met: [] Adjusted   2. Patient will have a decrease in pain to facilitate improvement in movement, function, and ADLs as indicated by Functional Deficits. [x] Progressing: [] Met: [] Not Met: [] Adjusted   3. Patient will demonstrate increased AROM to 120+ flex, 60+ ERto allow for proper joint functioning as indicated by patients Functional Deficits. 6 weeks   [x] Progressing: [] Met: [] Not Met: [] Adjusted  4. Patient will return to ADLs such as bathing/dressing/grooming without increased symptoms or restriction. 6 weeks  [x] Progressing: [] Met: [] Not Met: [] Adjusted    Long Term Goals: To be achieved in: 12-16+ weeks  1.  Disability index score of 20% or less for the UEFI to assist with reaching prior level of function. [] Progressing: [x] Met: [] Not Met: [] Adjusted  2. Patient will demonstrate increased AROM to 150+ flex, 80+ ERto allow for proper joint functioning as indicated by patients Functional Deficits. [x] Progressing: [] Met: [] Not Met: [] Adjusted  3. Patient will demonstrate an increase in Strength to 4/5 or greater to allow for proper functional mobility as indicated by patients Functional Deficits. [x] Progressing: [] Met: [] Not Met: [] Adjusted  4. Patient will return to reaching/lifting/carrying without increased symptoms or restriction. [x] Progressing: [] Met: [] Not Met: [] Adjusted  5. Patient will be able to lift/carry greater than 25lbs without increased symptoms or restriction in order to return to work duties. [x] Progressing: [] Met: [] Not Met: [] Adjusted    Overall Progression Towards Functional goals/ Treatment Progress Update:  [] Patient is progressing as expected towards functional goals listed. [x] Progression is slowed due to complexities/Impairments listed. [x] Progression has been slowed due to co-morbidities. [] Plan just implemented, too soon to assess goals progression <30days   [] Goals require adjustment due to lack of progress  [] Patient is not progressing as expected and requires additional follow up with physician  [] Other    Prognosis for POC: [x] Good [] Fair  [] Poor      Patient requires continued skilled intervention: [x] Yes  [] No    Treatment/Activity Tolerance:  [x] Patient able to complete treatment  [] Patient limited by fatigue  [] Patient limited by pain     [] Patient limited by other medical complications  [] Other: Pt tolerated tx well with appropriate fatigue expressed through posterior and lateral shoulder musculature at end of session. Pt required tactile and verbal cueing to improve scapular posture during Tband strengthening this date and was able to maintain mechanics and scapular stability following instruction.  Improved IR demo'd this date with passive stretch as evident by objective measures obtained this date. Pt reports improved/relief of fatigue symptoms when performing GHJ distraction utilizing contralateral UE and pt advised to trial stretch at night when symptoms increase to improve quality of life. We did discuss using meloxicam at night for pain control while sleeping to see if this will help. Continue to progress as tolerated with cuff strength and ROM. Return to Play: (if applicable)   []  Stage 1: Intro to Strength   []  Stage 2: Return to Run and Strength   []  Stage 3: Return to Jump and Strength   []  Stage 4: Dynamic Strength and Agility   []  Stage 5: Sport Specific Training     []  Ready to Return to Play, Meets All Above Stages   []  Not Ready for Return to Sports   Comments:                               PLAN: See eval  [x] Continue per plan of care [] Alter current plan (see comments above)  [] Plan of care initiated [] Hold pending MD visit [] Discharge  Note: If patient does not return for scheduled/ recommended follow up visits, this note will serve as a discharge from care along with most recent update on progress. Reviewed insurance benefits for physical therapy in an outpatient hospital based setting with the patient, including deductible  and allowable visit number.  Pt was informed of possible out of pocket costs, as well as, informed of other service options for continuing supervised sessions without required skilled PT intervention such as the [facility] program.     Electronically signed by: Luda Manriquez PTA [address], PT, DPT, Cert DN

## 2022-04-22 ENCOUNTER — HOSPITAL ENCOUNTER (OUTPATIENT)
Dept: PHYSICAL THERAPY | Age: 47
Setting detail: THERAPIES SERIES
Discharge: HOME | End: 2022-04-22
Payer: COMMERCIAL

## 2022-04-22 PROCEDURE — 97530 THERAPEUTIC ACTIVITIES: CPT

## 2022-04-22 PROCEDURE — 97110 THERAPEUTIC EXERCISES: CPT

## 2022-04-22 PROCEDURE — 97140 MANUAL THERAPY 1/> REGIONS: CPT

## 2022-04-22 NOTE — FLOWSHEET NOTE
f  [facility], [place]       Physical Therapy Daily Treatment Note  Date:  2022    Patient Name:  Sharon Mendoza    :  1975  MRN: [id number]  Medical/Treatment Diagnosis Information:  · Diagnosis: S46.011A Right Rotator cuff tear; sp RTC repair on 22  · Treatment Diagnosis: S46.011A Right Rotator cuff tear  Insurance/Certification information:  PT Insurance Information: [facility]  Physician Information:  Referring Practitioner: Josise Kat  Has the plan of care been signed (Y/N):        []  Yes  [x]  No     Date of Patient follow up with Physician: 22      Is this a Progress Report:     []  Yes  [x]  No        Progress report will be due (10 Rx or 30 days whichever is less):        Recertification will be due (POC Duration  / 90 days whichever is less):          Visit # Insurance Allowable Auth Required   16  1 18 12/3/21-4/15/22  12 22-22 []  Yes []  No      Functional Scale: UEFI 9%      Date assessed:  22  Functional Scale: UEFI 17%     Date assessed:  3/4/22   Functional Scale: UEFI 100%     Date assessed:  22      Latex Allergy:  [x]NO      []YES  Preferred Language for Healthcare:   [x]English       []other:    Pain level: 0/10     SUBJECTIVE:  Pt is 12 week sp. Pt notes he did take meloxicam but he still continues to wake at night feeling like \"someone punch the arm\" he feels like when he wakes up and moves the arm he feels better. He does report waking only 4-5x last night vs 8x earlier in the week.           OBJECTIVE:   Observation:    Test measurements:      ROM PROM AROM  Comment    L R  22    L R  22    Flexion      Abduction      ER  78 cane   65 in supine    IR  56 sleeper     Other        Other             Strength L R  22 Comment   Flexion     Abduction     ER     IR     Supraspinatus      Upper Trap      Lower Trap      Mid Trap      Rhomboids      Biceps      Triceps      Horizontal Abduction      Horizontal Adduction      Lats          RESTRICTIONS/PRECAUTIONS: sp R RTC repair w/biceps tenodesis and subacromial decomp; see Letters 2/16; May progress Forward Elevation by 10 degrees weekly up to 140 and External Rotation 5 degrees weekly up to 40      Exercises:  Exercise/Equipment Resistance Repetitions Other comments   Stretching/PROM      Wand  ER supine 10x10           Pulleys  10x10 flex, scap     Wall slides   10x10 flex, abd     Pec Doorway       Sleeper IR  10x10     Pendulum       PROM Elbow      Isometrics      Retraction            Weight shift      Flexion     Abduction     External Rotation     Internal Rotation      Biceps      Triceps            PRE's      Flexion      Abduction      External Rotation      Internal Rotation      Prone Row  3#  2x10     Shrugs      EXT      Reverse Flys      Serratus  Wall push up plus     Horizontal Abd with ER      Biceps  Reviewed for HEP   Triceps  Reviewed for HEP    Retraction      UK deltoid  5 min  painfree arc 1#    Cable Column/Theraband      External Rotation  3x10 green     Internal Rotation  3x10 black      Shrugs      Lats      Ext  3x10 black     Flex      Scapular Retraction  3x10 black      BIC      TRIC      PNF            Dynamic Stability      Body blade  [id number] IR/ER    BOW  x30 cw/ccw    Plyoback                Therapeutic Exercise and NMR EXR  [x] (98462) Provided verbal/tactile cueing for activities related to strengthening, flexibility, endurance, ROM  for improvements in scapular, scapulothoracic and UE control with self care, reaching, carrying, lifting, house/yardwork, driving/computer work.    [] (43963) Provided verbal/tactile cueing for activities related to improving balance, coordination, kinesthetic sense, posture, motor skill, proprioception  to assist with  scapular, scapulothoracic and UE control with self care, reaching, carrying, lifting, house/yardwork, driving/computer work.     Therapeutic Activities:    [x] (18952 or 90191) Provided verbal/tactile cueing for activities related to improving balance, coordination, kinesthetic sense, posture, motor skill, proprioception and motor activation to allow for proper function of scapular, scapulothoracic and UE control with self care, carrying, lifting, driving/computer work. Home Exercise Program:    [x] (54334) Reviewed/Progressed HEP activities related to strengthening, flexibility, endurance, ROM of scapular, scapulothoracic and UE control with self care, reaching, carrying, lifting, house/yardwork, driving/computer work  [] (57558) Reviewed/Progressed HEP activities related to improving balance, coordination, kinesthetic sense, posture, motor skill, proprioception of scapular, scapulothoracic and UE control with self care, reaching, carrying, lifting, house/yardwork, driving/computer work      Manual Treatments:  PROM / STM / Oscillations-Mobs:  G-I, II, III, IV (PA's, Inf., Post.)  [] (64559) Provided manual therapy to mobilize soft tissue/joints of cervical/CT, scapular GHJ and UE for the purpose of modulating pain, promoting relaxation,  increasing ROM, reducing/eliminating soft tissue swelling/inflammation/restriction, improving soft tissue extensibility and allowing for proper ROM for normal function with self care, reaching, carrying, lifting, house/yardwork, driving/computer work    Instrument Assisted Soft Tissue Mobilization (IASTM): was applied to the following muscles: infraspinatus, proximal biceps, mid deltoid, with Hawk  tools including HG2 (handle-bar), HG4 (small can-opener), HG5 (medium can-opener), HG 8 (biscotti), and HG9 (tongue depressor). Treatment consisted of IASTM strokes including sweeping, fanning, brushing, strumming, filleting, pinning and framing, based on body region contours, nature of the soft tissue restriction and desired treatment outcomes.  These techniques were used to restore neurophysiology, improve mechanotransduction, enhance fluid dynamics and break collagen crosslinks. The treatment area was exposed and the patient was draped in an appropriate manner. Upon completion the clinician cleaned the IASTM tools as per [facility] recommendations. Skin check pre:   Skin check post:   Intermittent tx time:  10    Modalities:      Charges: 1220-120  Timed Code Treatment Minutes: 60   Total Treatment Minutes: 60     [] EVAL (LOW) 76552   [] EVAL (MOD) 67069   [] EVAL (HIGH) 56809   [] RE-EVAL    [x] WH(92706) x  2 (6099-2118, 25 )  [] IONTO  [] NMR (66489) x     [] VASO  [x] Manual (60554) x 1(110-120, 10')   [] Other:  [x] TA x 2 (1245-110, 25')    [] Mech Traction (81609)  [] ES(attended) (27312)      [] ES (un) (53481):       HEP instruction:   Access Code: [email or web address]  URL: [email or web address]  Date: 04/15/2022  Prepared by: Yaneth Corado  Shoulder Flexion Wall Slide with Towel - 1 x daily - 7 x weekly - 10 reps - 10 hold  Sleeper Stretch - 1 x daily - 7 x weekly - 10 reps - 10 hold  Supine Shoulder External Rotation in 45 Degrees Abduction AAROM with Dowel - 1 x daily - 7 x weekly - 10 reps - 10 hold  UK Deltoid Progam - 1 x daily - 7 x weekly - 3-5 minutes  Scapular Retraction with Resistance - 1 x daily - 3-4 x weekly - 3 sets - 10 reps  Standing Bent Over Triceps Extension - 1 x daily - 3-4 x weekly - 3 sets - 10 reps  Staggered Stance Biceps Curl - 1 x daily - 3-4 x weekly - 3 sets - 10 reps  Shoulder External Rotation with Anchored Resistance - 1 x daily - 3-4 x weekly - 3 sets - 10 reps  Shoulder Internal Rotation with Resistance - 1 x daily - 3-4 x weekly - 3 sets - 10 reps  Standing Wall Ball Circles with Mini Swiss Ball - 1 x daily - 3-4 x weekly - 3 sets - 10 reps        GOALS:  Patient stated goal: return to work duties without pain    [] Progressing: [] Met: [] Not Met: [] Adjusted    Therapist goals for Patient:   Short Term Goals: To be achieved in: 2-6 weeks  1.  Independent in HEP and progression per patient tolerance, in order to prevent re-injury. [] Progressing: [] Met: [] Not Met: [] Adjusted   2. Patient will have a decrease in pain to facilitate improvement in movement, function, and ADLs as indicated by Functional Deficits. [x] Progressing: [] Met: [] Not Met: [] Adjusted   3. Patient will demonstrate increased AROM to 120+ flex, 60+ ERto allow for proper joint functioning as indicated by patients Functional Deficits. 6 weeks   [x] Progressing: [] Met: [] Not Met: [] Adjusted  4. Patient will return to ADLs such as bathing/dressing/grooming without increased symptoms or restriction. 6 weeks  [x] Progressing: [] Met: [] Not Met: [] Adjusted    Long Term Goals: To be achieved in: 12-16+ weeks  1. Disability index score of 20% or less for the UEFI to assist with reaching prior level of function. [] Progressing: [x] Met: [] Not Met: [] Adjusted  2. Patient will demonstrate increased AROM to 150+ flex, 80+ ERto allow for proper joint functioning as indicated by patients Functional Deficits. [x] Progressing: [] Met: [] Not Met: [] Adjusted  3. Patient will demonstrate an increase in Strength to 4/5 or greater to allow for proper functional mobility as indicated by patients Functional Deficits. [x] Progressing: [] Met: [] Not Met: [] Adjusted  4. Patient will return to reaching/lifting/carrying without increased symptoms or restriction. [x] Progressing: [] Met: [] Not Met: [] Adjusted  5. Patient will be able to lift/carry greater than 25lbs without increased symptoms or restriction in order to return to work duties. [x] Progressing: [] Met: [] Not Met: [] Adjusted    Overall Progression Towards Functional goals/ Treatment Progress Update:  [] Patient is progressing as expected towards functional goals listed. [x] Progression is slowed due to complexities/Impairments listed. [x] Progression has been slowed due to co-morbidities.   [] Plan just implemented, too soon to assess goals progression <30days   [] Goals require adjustment due to lack of progress  [] Patient is not progressing as expected and requires additional follow up with physician  [] Other    Prognosis for POC: [x] Good [] Fair  [] Poor      Patient requires continued skilled intervention: [x] Yes  [] No    Treatment/Activity Tolerance:  [x] Patient able to complete treatment  [] Patient limited by fatigue  [] Patient limited by pain     [] Patient limited by other medical complications  [] Other:  Pt able to complete exercises and increase resistance for PREs as well as increase dynamic stability exercises today without pain during session. Did utilize IASTM to shoulder complex region dt ongoing compliants of pain at night. Pt notes after IASTM his shoulder feels warm and loose. We did discuss continuing with meloxicam as prescribed as it does require consistent use to build up efficacy in the body, pt verbalized understanding. Return to Play: (if applicable)   []  Stage 1: Intro to Strength   []  Stage 2: Return to Run and Strength   []  Stage 3: Return to Jump and Strength   []  Stage 4: Dynamic Strength and Agility   []  Stage 5: Sport Specific Training     []  Ready to Return to Play, Meets All Above Stages   []  Not Ready for Return to Sports   Comments:                               PLAN: See eval  [x] Continue per plan of care [] Alter current plan (see comments above)  [] Plan of care initiated [] Hold pending MD visit [] Discharge  Note: If patient does not return for scheduled/ recommended follow up visits, this note will serve as a discharge from care along with most recent update on progress. Reviewed insurance benefits for physical therapy in an outpatient hospital based setting with the patient, including deductible  and allowable visit number.  Pt was informed of possible out of pocket costs, as well as, informed of other service options for continuing supervised sessions without required skilled PT intervention such as the cash based [employer] program.     Electronically signed by:   Juan Carlos Katz, PT, DPT, Cert DN

## 2022-04-25 ENCOUNTER — TELEPHONE (OUTPATIENT)
Dept: ORTHOPEDIC SURGERY | Age: 47
End: 2022-04-25

## 2022-04-25 NOTE — TELEPHONE ENCOUNTER
Other   04.25.2022  Missing [employer] request for dos' 01/19/2022, 01/27/2022, 02/16/2022, 03/12/2022 & 04/06/2022    [facility] request callback regarding compliance: [phone]. Clinic please notify Rachell Miranda in Workers Comp when complete.

## 2022-04-27 ENCOUNTER — HOSPITAL ENCOUNTER (OUTPATIENT)
Dept: PHYSICAL THERAPY | Age: 47
Setting detail: THERAPIES SERIES
Discharge: HOME | End: 2022-04-27
Payer: COMMERCIAL

## 2022-04-27 PROCEDURE — 97110 THERAPEUTIC EXERCISES: CPT

## 2022-04-27 PROCEDURE — 20560 NDL INSJ W/O NJX 1 OR 2 MUSC: CPT

## 2022-04-27 PROCEDURE — 97530 THERAPEUTIC ACTIVITIES: CPT

## 2022-04-27 NOTE — FLOWSHEET NOTE
f  [facility], [facility]       Physical Therapy Daily Treatment Note  Date:  2022    Patient Name:  Leti Archuleta    :  1975  MRN: [id number]  Medical/Treatment Diagnosis Information:  · Diagnosis: S46.011A Right Rotator cuff tear; sp RTC repair on 22  · Treatment Diagnosis: S46.011A Right Rotator cuff tear  Insurance/Certification information:  PT Insurance Information: [facility]  Physician Information:  Referring Practitioner: Yoel Ramirez  Has the plan of care been signed (Y/N):        []  Yes  [x]  No     Date of Patient follow up with Physician: 22      Is this a Progress Report:     []  Yes  [x]  No        Progress report will be due (10 Rx or 30 days whichever is less): 39       Recertification will be due (POC Duration  / 90 days whichever is less):          Visit # Insurance Allowable Auth Required     3    16   12 22-22    18 12/3/21-4/15/22 []  Yes []  No      Functional Scale: UEFI 9%      Date assessed:  22  Functional Scale: UEFI 17%     Date assessed:  3/4/22   Functional Scale: UEFI 100%     Date assessed:  22      Latex Allergy:  [x]NO      []YES  Preferred Language for Healthcare:   [x]English       []other:    Pain level: 0/10     SUBJECTIVE:  Pt is 13 week sp. Pt notes that he did feel better for 2-3 nights following last session with iastm. He notes pain returned the last couple of nights. He has woken up about 5x while trying to laying in bed but once he switched to recliner he was able to sleep without pain.         OBJECTIVE:   Observation:    Test measurements:      ROM PROM AROM  Comment    L R  22    L R  22    Flexion      Abduction      ER  81 cane      IR  56 sleeper     Other        Other             Strength L R  22 Comment   Flexion     Abduction     ER     IR     Supraspinatus      Upper Trap      Lower Trap      Mid Trap      Rhomboids      Biceps      Triceps      Horizontal Abduction      Horizontal Adduction      Lats          RESTRICTIONS/PRECAUTIONS: sp R RTC repair w/biceps tenodesis and subacromial decomp; see Letters 2/16; May progress Forward Elevation by 10 degrees weekly up to 140 and External Rotation 5 degrees weekly up to 40      Exercises:  Exercise/Equipment Resistance Repetitions Other comments   Stretching/PROM      Wand  ER supine 10x10           Pulleys      Wall slides   10x10 flex, abd     Pec Doorway       Sleeper IR  10x10     Pendulum       PROM Elbow      Isometrics      Retraction            Weight shift      Flexion     Abduction     External Rotation     Internal Rotation      Biceps      Triceps            PRE's      Flexion      Abduction      External Rotation      Internal Rotation      Prone Row  3#  2x10     Shrugs      EXT      Reverse Flys      Serratus  Wall push up plus     Horizontal Abd with ER      Biceps  Reviewed for HEP   Triceps  Reviewed for HEP    Retraction      UK deltoid  5 min  painfree arc 1#    Cable Column/Theraband      External Rotation  3x10 green     Internal Rotation  3x10 black      Shrugs      Lats      Ext  3x10 black     Flex      Scapular Retraction  3x10 black      BIC      TRIC      PNF            Dynamic Stability      Body blade     BOW     Plyoback                Therapeutic Exercise and NMR EXR  [x] (55598) Provided verbal/tactile cueing for activities related to strengthening, flexibility, endurance, ROM  for improvements in scapular, scapulothoracic and UE control with self care, reaching, carrying, lifting, house/yardwork, driving/computer work.    [] (72546) Provided verbal/tactile cueing for activities related to improving balance, coordination, kinesthetic sense, posture, motor skill, proprioception  to assist with  scapular, scapulothoracic and UE control with self care, reaching, carrying, lifting, house/yardwork, driving/computer work.     Therapeutic Activities:    [x] (63302 or 24288) Provided verbal/tactile cueing for activities related to improving balance, coordination, kinesthetic sense, posture, motor skill, proprioception and motor activation to allow for proper function of scapular, scapulothoracic and UE control with self care, carrying, lifting, driving/computer work. Home Exercise Program:    [x] (73886) Reviewed/Progressed HEP activities related to strengthening, flexibility, endurance, ROM of scapular, scapulothoracic and UE control with self care, reaching, carrying, lifting, house/yardwork, driving/computer work  [] (77416) Reviewed/Progressed HEP activities related to improving balance, coordination, kinesthetic sense, posture, motor skill, proprioception of scapular, scapulothoracic and UE control with self care, reaching, carrying, lifting, house/yardwork, driving/computer work      Manual Treatments:  PROM / STM / Oscillations-Mobs:  G-I, II, III, IV (PA's, Inf., Post.)  [] (61572) Provided manual therapy to mobilize soft tissue/joints of cervical/CT, scapular GHJ and UE for the purpose of modulating pain, promoting relaxation,  increasing ROM, reducing/eliminating soft tissue swelling/inflammation/restriction, improving soft tissue extensibility and allowing for proper ROM for normal function with self care, reaching, carrying, lifting, house/yardwork, driving/computer work      PT reviewed precautions, contraindications, and indications with pt in addition to application of dry needling within established plan of care and expected outcomes; pt verbalized understanding with all questions answered. Pt had signed consent form for dry needling and PT obtained written consent with updated plan of care from MD before beginning. Dry needling manual therapy: consisted on the placement of 2-3 needles in the following muscles: Right infraspinatus, Right middle deltoid . A 50 mm needle was inserted, piston, rotated, and coned to produce intramuscular mobilization.   These techniques were used to restore functional range of motion, reduce muscle spasm and induce healing in the corresponding musculature. (95105)  Clean Technique was utilized today while applying Dry needling treatment. The treatment sites where cleaned with 70% solution of  isopropyl alcohol . The PT washed their hands and utilized treatment gloves along with hand  prior to inserting the needles. All needles where removed and discarded in the appropriate sharps container. Modalities:      Charges: 200-253  Timed Code Treatment Minutes: 60   Total Treatment Minutes: 60     [] EVAL (LOW) 13857   [] EVAL (MOD) 14149   [] EVAL (HIGH) 12312   [] RE-EVAL    [x] CP(28947) x  2 (200-225, 25 )  [] IONTO  [] NMR (20437) x     [] VASO  [x] Manual (38816) x   [x] Other: DN x1-2mm  (235-253)  [x] TA x 2 (156-559, 10')    [] Mech Traction (19796)  [] ES(attended) (49379)      [] ES (un) (51619):       HEP instruction:   Access Code: [email or web address]  URL: [email or web address]  Date: 04/15/2022  Prepared by: Baldemar Weiss    Exercises  Shoulder Flexion Wall Slide with Towel - 1 x daily - 7 x weekly - 10 reps - 10 hold  Sleeper Stretch - 1 x daily - 7 x weekly - 10 reps - 10 hold  Supine Shoulder External Rotation in 45 Degrees Abduction AAROM with Dowel - 1 x daily - 7 x weekly - 10 reps - 10 hold  UK Deltoid Progam - 1 x daily - 7 x weekly - 3-5 minutes  Scapular Retraction with Resistance - 1 x daily - 3-4 x weekly - 3 sets - 10 reps  Standing Bent Over Triceps Extension - 1 x daily - 3-4 x weekly - 3 sets - 10 reps  Staggered Stance Biceps Curl - 1 x daily - 3-4 x weekly - 3 sets - 10 reps  Shoulder External Rotation with Anchored Resistance - 1 x daily - 3-4 x weekly - 3 sets - 10 reps  Shoulder Internal Rotation with Resistance - 1 x daily - 3-4 x weekly - 3 sets - 10 reps  Standing Wall Ball Circles with Mini Swiss Ball - 1 x daily - 3-4 x weekly - 3 sets - 10 reps        GOALS:  Patient stated goal: return to work duties without pain    [] Progressing: [] Met: [] Not Met: [] Adjusted    Therapist goals for Patient:   Short Term Goals: To be achieved in: 2-6 weeks  1. Independent in HEP and progression per patient tolerance, in order to prevent re-injury. [] Progressing: [] Met: [] Not Met: [] Adjusted   2. Patient will have a decrease in pain to facilitate improvement in movement, function, and ADLs as indicated by Functional Deficits. [x] Progressing: [] Met: [] Not Met: [] Adjusted   3. Patient will demonstrate increased AROM to 120+ flex, 60+ ERto allow for proper joint functioning as indicated by patients Functional Deficits. 6 weeks   [x] Progressing: [] Met: [] Not Met: [] Adjusted  4. Patient will return to ADLs such as bathing/dressing/grooming without increased symptoms or restriction. 6 weeks  [x] Progressing: [] Met: [] Not Met: [] Adjusted    Long Term Goals: To be achieved in: 12-16+ weeks  1. Disability index score of 20% or less for the UEFI to assist with reaching prior level of function. [] Progressing: [x] Met: [] Not Met: [] Adjusted  2. Patient will demonstrate increased AROM to 150+ flex, 80+ ERto allow for proper joint functioning as indicated by patients Functional Deficits. [x] Progressing: [] Met: [] Not Met: [] Adjusted  3. Patient will demonstrate an increase in Strength to 4/5 or greater to allow for proper functional mobility as indicated by patients Functional Deficits. [x] Progressing: [] Met: [] Not Met: [] Adjusted  4. Patient will return to reaching/lifting/carrying without increased symptoms or restriction. [x] Progressing: [] Met: [] Not Met: [] Adjusted  5. Patient will be able to lift/carry greater than 25lbs without increased symptoms or restriction in order to return to work duties. [x] Progressing: [] Met: [] Not Met: [] Adjusted    Overall Progression Towards Functional goals/ Treatment Progress Update:  [] Patient is progressing as expected towards functional goals listed.     [x] Progression is slowed due to complexities/Impairments listed. [x] Progression has been slowed due to co-morbidities. [] Plan just implemented, too soon to assess goals progression <30days   [] Goals require adjustment due to lack of progress  [] Patient is not progressing as expected and requires additional follow up with physician  [] Other    Prognosis for POC: [x] Good [] Fair  [] Poor      Patient requires continued skilled intervention: [x] Yes  [] No    Treatment/Activity Tolerance:  [x] Patient able to complete treatment  [] Patient limited by fatigue  [] Patient limited by pain     [] Patient limited by other medical complications  [] Other:  Per MD, DN okay to try. Pt tolerated DN well, some LTR was observed in infraspinatus. With middle deltoid, pt notes that the discomfort from needling is the same pain he feels at night. He is sore after DN, we discussed continuing to move. stretch shoulder tonight and to ice before bed. MD office also suggested her try topical voltaren gel as needed. Return to Play: (if applicable)   []  Stage 1: Intro to Strength   []  Stage 2: Return to Run and Strength   []  Stage 3: Return to Jump and Strength   []  Stage 4: Dynamic Strength and Agility   []  Stage 5: Sport Specific Training     []  Ready to Return to Play, Meets All Above Stages   []  Not Ready for Return to Sports   Comments:                               PLAN: See eval  [x] Continue per plan of care [] Alter current plan (see comments above)  [] Plan of care initiated [] Hold pending MD visit [] Discharge  Note: If patient does not return for scheduled/ recommended follow up visits, this note will serve as a discharge from care along with most recent update on progress. Reviewed insurance benefits for physical therapy in an outpatient hospital based setting with the patient, including deductible  and allowable visit number.  Pt was informed of possible out of pocket costs, as well as, informed of other service options for continuing supervised sessions without required skilled PT intervention such as the [facility] program.     Electronically signed by:   Lindsey Albrecht, PT, DPT, Cert DN

## 2022-04-29 ENCOUNTER — HOSPITAL ENCOUNTER (OUTPATIENT)
Dept: PHYSICAL THERAPY | Age: 47
Setting detail: THERAPIES SERIES
Discharge: HOME | End: 2022-04-29
Payer: COMMERCIAL

## 2022-04-29 PROCEDURE — 20560 NDL INSJ W/O NJX 1 OR 2 MUSC: CPT

## 2022-04-29 PROCEDURE — 97530 THERAPEUTIC ACTIVITIES: CPT

## 2022-04-29 PROCEDURE — 97110 THERAPEUTIC EXERCISES: CPT

## 2022-04-29 NOTE — FLOWSHEET NOTE
f  [facility], [place]       Physical Therapy Daily Treatment Note  Date:  2022    Patient Name:  eKren Ingram    :  1975  MRN: [id number]  Medical/Treatment Diagnosis Information:  · Diagnosis: S46.011A Right Rotator cuff tear; sp RTC repair on 22  · Treatment Diagnosis: S46.011A Right Rotator cuff tear  Insurance/Certification information:  PT Insurance Information: [facility]  Physician Information:  Referring Practitioner: Donavan Riddle  Has the plan of care been signed (Y/N):        []  Yes  [x]  No     Date of Patient follow up with Physician: 22      Is this a Progress Report:     []  Yes  [x]  No        Progress report will be due (10 Rx or 30 days whichever is less): 03       Recertification will be due (POC Duration  / 90 days whichever is less):          Visit # Insurance Allowable Auth Required        12 22-22    18 12/3/21-4/15/22 []  Yes []  No      Functional Scale: UEFI 9%      Date assessed:  22  Functional Scale: UEFI 17%     Date assessed:  3/4/22   Functional Scale: UEFI 100%     Date assessed:  22      Latex Allergy:  [x]NO      []YES  Preferred Language for Healthcare:   [x]English       []other:    Pain level: 0/10     SUBJECTIVE:  Pt is 14 week sp. Pt notes that after DN last session he has slept very well at night, has only woken up 1x at night d/t pain.         OBJECTIVE:   Observation:    Test measurements:      ROM PROM AROM  Comment    L R  22    L R  22    Flexion      Abduction      ER  90 cane      IR  56 sleeper     Other        Other             Strength L R  22 Comment   Flexion     Abduction     ER     IR     Supraspinatus      Upper Trap      Lower Trap      Mid Trap      Rhomboids      Biceps      Triceps      Horizontal Abduction      Horizontal Adduction      Lats          RESTRICTIONS/PRECAUTIONS: sp R RTC repair w/biceps tenodesis and subacromial decomp; see Letters 2/16; May progress Forward Elevation by 10 degrees weekly up to 140 and External Rotation 5 degrees weekly up to 40      Exercises:  Exercise/Equipment Resistance Repetitions Other comments   Stretching/PROM      Wand  ER supine 10x10           Pulleys      Wall slides   10x10 flex, abd     Pec Doorway       Sleeper IR  10x10     Pendulum       PROM Elbow      Isometrics      Retraction            Weight shift      Flexion     Abduction     External Rotation     Internal Rotation      Biceps      Triceps            PRE's      Flexion      Abduction      External Rotation      Internal Rotation      Prone Row     Shrugs      EXT      Reverse Flys      Serratus  Wall push up plus     Horizontal Abd with ER      Biceps  Reviewed for HEP   Triceps  Reviewed for HEP    Retraction      UK deltoid      Cable Column/Theraband      External Rotation  3x10 blue    Internal Rotation  3x10 silver      Shrugs      Lats      Ext  3x10 silver    Flex      Scapular Retraction  3x10 silver      BIC      TRIC      PNF            Dynamic Stability      Body blade     BOW     Plyoback                Therapeutic Exercise and NMR EXR  [x] (16354) Provided verbal/tactile cueing for activities related to strengthening, flexibility, endurance, ROM  for improvements in scapular, scapulothoracic and UE control with self care, reaching, carrying, lifting, house/yardwork, driving/computer work.    [] (39664) Provided verbal/tactile cueing for activities related to improving balance, coordination, kinesthetic sense, posture, motor skill, proprioception  to assist with  scapular, scapulothoracic and UE control with self care, reaching, carrying, lifting, house/yardwork, driving/computer work.     Therapeutic Activities:    [x] (37972 or 94494) Provided verbal/tactile cueing for activities related to improving balance, coordination, kinesthetic sense, posture, motor skill, proprioception and motor activation to allow for proper function of scapular, scapulothoracic and UE control with self care, carrying, lifting, driving/computer work. Home Exercise Program:    [x] (97026) Reviewed/Progressed HEP activities related to strengthening, flexibility, endurance, ROM of scapular, scapulothoracic and UE control with self care, reaching, carrying, lifting, house/yardwork, driving/computer work  [] (37413) Reviewed/Progressed HEP activities related to improving balance, coordination, kinesthetic sense, posture, motor skill, proprioception of scapular, scapulothoracic and UE control with self care, reaching, carrying, lifting, house/yardwork, driving/computer work      Manual Treatments:  PROM / STM / Oscillations-Mobs:  G-I, II, III, IV (PA's, Inf., Post.)  [] (66802) Provided manual therapy to mobilize soft tissue/joints of cervical/CT, scapular GHJ and UE for the purpose of modulating pain, promoting relaxation,  increasing ROM, reducing/eliminating soft tissue swelling/inflammation/restriction, improving soft tissue extensibility and allowing for proper ROM for normal function with self care, reaching, carrying, lifting, house/yardwork, driving/computer work      PT reviewed precautions, contraindications, and indications with pt in addition to application of dry needling within established plan of care and expected outcomes; pt verbalized understanding with all questions answered. Pt had signed consent form for dry needling and PT obtained written consent with updated plan of care from MD before beginning. Dry needling manual therapy: consisted on the placement of 2-3 needles in the following muscles: , Right middle deltoid . A 50 mm needle was inserted, piston, rotated, and coned to produce intramuscular mobilization. These techniques were used to restore functional range of motion, reduce muscle spasm and induce healing in the corresponding musculature. (26377)  Clean Technique was utilized today while applying Dry needling treatment.   The treatment sites where cleaned with 70% solution of  isopropyl alcohol . The PT washed their hands and utilized treatment gloves along with hand  prior to inserting the needles. All needles where removed and discarded in the appropriate sharps container. Modalities:      Charges: [phone]  Timed Code Treatment Minutes: 35   Total Treatment Minutes: 35     [] EVAL (LOW) 59646   [] EVAL (MOD) 02947   [] EVAL (HIGH) 62161   [] RE-EVAL    [x] ZG(30718) x  1 (495-877, 15 )  [] IONTO  [] NMR (97164) x     [] VASO  [x] Manual (98031) x   [x] Other: DN x1-2mm  (866-021, 5')  [x] TA x 2 (250-304, 15')    [] Mech Traction (32424)  [] ES(attended) (49824)      [] ES (un) (97999):       HEP instruction:   Access Code: [email or web address]  URL: [email or web address]  Date: 04/15/2022  Prepared by: Juhi Martell    Exercises  Shoulder Flexion Wall Slide with Towel - 1 x daily - 7 x weekly - 10 reps - 10 hold  Sleeper Stretch - 1 x daily - 7 x weekly - 10 reps - 10 hold  Supine Shoulder External Rotation in 45 Degrees Abduction AAROM with Dowel - 1 x daily - 7 x weekly - 10 reps - 10 hold  UK Deltoid Progam - 1 x daily - 7 x weekly - 3-5 minutes  Scapular Retraction with Resistance - 1 x daily - 3-4 x weekly - 3 sets - 10 reps  Standing Bent Over Triceps Extension - 1 x daily - 3-4 x weekly - 3 sets - 10 reps  Staggered Stance Biceps Curl - 1 x daily - 3-4 x weekly - 3 sets - 10 reps  Shoulder External Rotation with Anchored Resistance - 1 x daily - 3-4 x weekly - 3 sets - 10 reps  Shoulder Internal Rotation with Resistance - 1 x daily - 3-4 x weekly - 3 sets - 10 reps  Standing Wall Ball Circles with Mini Swiss Ball - 1 x daily - 3-4 x weekly - 3 sets - 10 reps        GOALS:  Patient stated goal: return to work duties without pain    [] Progressing: [] Met: [] Not Met: [] Adjusted    Therapist goals for Patient:   Short Term Goals: To be achieved in: 2-6 weeks  1.  Independent in HEP and progression per patient tolerance, in order to prevent re-injury. [] Progressing: [] Met: [] Not Met: [] Adjusted   2. Patient will have a decrease in pain to facilitate improvement in movement, function, and ADLs as indicated by Functional Deficits. [x] Progressing: [] Met: [] Not Met: [] Adjusted   3. Patient will demonstrate increased AROM to 120+ flex, 60+ ERto allow for proper joint functioning as indicated by patients Functional Deficits. 6 weeks   [x] Progressing: [] Met: [] Not Met: [] Adjusted  4. Patient will return to ADLs such as bathing/dressing/grooming without increased symptoms or restriction. 6 weeks  [x] Progressing: [] Met: [] Not Met: [] Adjusted    Long Term Goals: To be achieved in: 12-16+ weeks  1. Disability index score of 20% or less for the UEFI to assist with reaching prior level of function. [] Progressing: [x] Met: [] Not Met: [] Adjusted  2. Patient will demonstrate increased AROM to 150+ flex, 80+ ERto allow for proper joint functioning as indicated by patients Functional Deficits. [x] Progressing: [] Met: [] Not Met: [] Adjusted  3. Patient will demonstrate an increase in Strength to 4/5 or greater to allow for proper functional mobility as indicated by patients Functional Deficits. [x] Progressing: [] Met: [] Not Met: [] Adjusted  4. Patient will return to reaching/lifting/carrying without increased symptoms or restriction. [x] Progressing: [] Met: [] Not Met: [] Adjusted  5. Patient will be able to lift/carry greater than 25lbs without increased symptoms or restriction in order to return to work duties. [x] Progressing: [] Met: [] Not Met: [] Adjusted    Overall Progression Towards Functional goals/ Treatment Progress Update:  [] Patient is progressing as expected towards functional goals listed. [x] Progression is slowed due to complexities/Impairments listed. [x] Progression has been slowed due to co-morbidities.   [] Plan just implemented, too soon to assess goals progression <30days   [] Goals require adjustment due to lack of progress  [] Patient is not progressing as expected and requires additional follow up with physician  [] Other    Prognosis for POC: [x] Good [] Fair  [] Poor      Patient requires continued skilled intervention: [x] Yes  [] No    Treatment/Activity Tolerance:  [x] Patient able to complete treatment  [] Patient limited by fatigue  [] Patient limited by pain     [] Patient limited by other medical complications  [] Other:  Pt tolerated DN well again today, LTR noted in mid delt. Pt did have some bleeding from needling site, applied pressure for 5 seconds and bleeding stopped, pt informed bruising may occur in this area. Pt able to progress resistance with PREs but did not progress exercises further so as not to cause increased soreness after DN. Continue to progress per pt tolerance for strengthening, monitor symptoms and continue DN as needed. Return to Play: (if applicable)   []  Stage 1: Intro to Strength   []  Stage 2: Return to Run and Strength   []  Stage 3: Return to Jump and Strength   []  Stage 4: Dynamic Strength and Agility   []  Stage 5: Sport Specific Training     []  Ready to Return to Play, Meets All Above Stages   []  Not Ready for Return to Sports   Comments:                               PLAN: See eval  [x] Continue per plan of care [] Alter current plan (see comments above)  [] Plan of care initiated [] Hold pending MD visit [] Discharge  Note: If patient does not return for scheduled/ recommended follow up visits, this note will serve as a discharge from care along with most recent update on progress. Reviewed insurance benefits for physical therapy in an outpatient hospital based setting with the patient, including deductible  and allowable visit number.  Pt was informed of possible out of pocket costs, as well as, informed of other service options for continuing supervised sessions without required skilled PT intervention such as the cash based [employer] program. Electronically signed by:   Poppy Henderson, PT, DPT, Cert DN

## 2022-05-02 ENCOUNTER — TELEPHONE (OUTPATIENT)
Dept: ORTHOPEDIC SURGERY | Age: 47
End: 2022-05-02

## 2022-05-04 ENCOUNTER — HOSPITAL ENCOUNTER (OUTPATIENT)
Dept: PHYSICAL THERAPY | Age: 47
Setting detail: THERAPIES SERIES
Discharge: HOME | End: 2022-05-04
Payer: COMMERCIAL

## 2022-05-04 NOTE — FLOWSHEET NOTE
[id number] [facility]     Physical Therapy  Cancellation/No-show Note  Patient Name:  Nel Rosales  :  1975   Date:  2022   Cancelled visits to date: 02  No-shows to date: 0    For today's appointment patient:  []  Cancelled  []  Rescheduled appointment  [x]  No-show     Reason given by patient:  []  Patient ill  []  Conflicting appointment  []  No transportation    []  Conflict with work  []  No reason given  []  Other:     Comments:  Left vm 15 min after missed appt time, pt scheduled for .      Electronically signed by:  Erendira Rico, PT, PT

## 2022-05-06 ENCOUNTER — HOSPITAL ENCOUNTER (OUTPATIENT)
Dept: PHYSICAL THERAPY | Age: 47
Setting detail: THERAPIES SERIES
Discharge: HOME | End: 2022-05-06
Payer: COMMERCIAL

## 2022-05-06 PROCEDURE — 97110 THERAPEUTIC EXERCISES: CPT

## 2022-05-06 PROCEDURE — 97530 THERAPEUTIC ACTIVITIES: CPT

## 2022-05-06 NOTE — PLAN OF CARE
f  [facility], [place]    Physical Therapy Re-Certification Plan of Seth Peterson      Dear  Dr. Jossie Kat,     We had the pleasure of treating the following patient for physical therapy services at [address] [place] [address]. A summary of our findings can be found in the updated assessment below. This includes our plan of care. If you have any questions or concerns regarding these findings, please do not hesitate to contact me at the office phone number checked above. Thank you for the referral.     Physician Signature:________________________________Date:__________________  By signing above (or electronic signature), therapists plan is approved by physician    Date Range Of Visits: 22-22  Total Visits to Date: 21  Overall Response to Treatment:   []Patient is responding well to treatment and improvement is noted with regards  to goals   []Patient should continue to improve in reasonable time if they continue HEP   []Patient has plateaued and is no longer responding to skilled PT intervention    []Patient is getting worse and would benefit from return to referring MD   []Patient unable to adhere to initial POC   []Other: Pt is roughly 4 months s/p R RTC repair with biceps tenodesis. He was limited by pain at night for the last several weeks but this has seemed to resolve following use of dry needling and stretching. He shows improved ROM and improving strength with only slight deficits in flexion and abduction strength but is not painful to test. Pt would benefit from skilled intervention to continue to safely progress strength 1x per week for 4-6 to progress to independent HEP and return to PLOF without restriction.        Physical Therapy Daily Treatment Note  Date:  2022    Patient Name:  Sharno Mendoza    :  1975  MRN: [id number]  Medical/Treatment Diagnosis Information:  · Diagnosis: S46.011A Right Rotator cuff tear; sp RTC repair on 1/24/22  · Treatment Diagnosis: S46.011A Right Rotator cuff tear  Insurance/Certification information:  PT Insurance Information: [facility]  Physician Information:  Referring Practitioner: Leonor Nunn  Has the plan of care been signed (Y/N):        []  Yes  [x]  No     Date of Patient follow up with Physician: 5/18/22      Is this a Progress Report:     []  Yes  [x]  No        Progress report will be due (10 Rx or 30 days whichever is less): 6/9/19       Recertification will be due (POC Duration  / 90 days whichever is less):          Visit # Insurance Allowable Auth Required     5    16   12 4/16/22-5/28/22    18 12/3/21-4/15/22 []  Yes []  No        Functional Scale: UEFI 9%      Date assessed:  4/1/22  Functional Scale: UEFI 17%     Date assessed:  3/4/22   Functional Scale: UEFI 100%     Date assessed:  1/28/22      Latex Allergy:  [x]NO      []YES  Preferred Language for Healthcare:   [x]English       []other:    Pain level: 0/10     SUBJECTIVE:  Pt is 15 week sp. Pt notes that he has some soreness when he first wakes up in the morning but he is now sleeping through the night and no longer has pain in shoulder. He feels like he has no restrictions with ADLs now.      OBJECTIVE:   Observation:    Test measurements:      ROM PROM AROM  Comment    L R  05/06/22    L R  05/6/22    Flexion   150    Abduction   150    ER  90 cane   86    IR  57 sleeper  50    Other        Other             Strength L R  05/6/22 Comment   Flexion  4    Abduction  4    ER  4+    IR  4+    Supraspinatus      Upper Trap      Lower Trap      Mid Trap      Rhomboids      Biceps      Triceps      Horizontal Abduction      Horizontal Adduction      Lats          RESTRICTIONS/PRECAUTIONS: sp R RTC repair w/biceps tenodesis and subacromial decomp; see Letters 2/16; May progress Forward Elevation by 10 degrees weekly up to 140 and External Rotation 5 degrees weekly up to 40      Exercises:  Exercise/Equipment Resistance Repetitions Other comments Stretching/PROM      Wand  ER supine 10x10           Pulleys      Wall slides   10x10 flex, abd     Pec Doorway       Sleeper IR  10x10     Pendulum       PROM Elbow      Isometrics      Retraction            Weight shift      Flexion     Abduction     External Rotation     Internal Rotation      Biceps      Triceps            PRE's      Flexion      Abduction      External Rotation      Internal Rotation      Prone Row     Shrugs      EXT      Reverse Flys      Serratus  Wall push up plus     Horizontal Abd with ER      Biceps  Reviewed for HEP   Triceps  Reviewed for HEP    Retraction      UK deltoid      Cable Column/Theraband      External Rotation  3x10 blue    Internal Rotation  3x10 silver      Shrugs      Lats      Ext     Flex     Scapular Retraction     BIC      TRIC      PNF  3x10   d2 ext black  d2 flex red           Dynamic Stability      Body blade  [id number] IR/ER   BOW  [id number] 90-90 dribble    Plyoback                Therapeutic Exercise and NMR EXR  [x] (73829) Provided verbal/tactile cueing for activities related to strengthening, flexibility, endurance, ROM  for improvements in scapular, scapulothoracic and UE control with self care, reaching, carrying, lifting, house/yardwork, driving/computer work.    [] (59735) Provided verbal/tactile cueing for activities related to improving balance, coordination, kinesthetic sense, posture, motor skill, proprioception  to assist with  scapular, scapulothoracic and UE control with self care, reaching, carrying, lifting, house/yardwork, driving/computer work. Therapeutic Activities:    [x] (81776 or 85456) Provided verbal/tactile cueing for activities related to improving balance, coordination, kinesthetic sense, posture, motor skill, proprioception and motor activation to allow for proper function of scapular, scapulothoracic and UE control with self care, carrying, lifting, driving/computer work.      Home Exercise Program:    [x] (36140) Reviewed/Progressed HEP activities related to strengthening, flexibility, endurance, ROM of scapular, scapulothoracic and UE control with self care, reaching, carrying, lifting, house/yardwork, driving/computer work  [] (35991) Reviewed/Progressed HEP activities related to improving balance, coordination, kinesthetic sense, posture, motor skill, proprioception of scapular, scapulothoracic and UE control with self care, reaching, carrying, lifting, house/yardwork, driving/computer work      Manual Treatments:  PROM / STM / Oscillations-Mobs:  G-I, II, III, IV (PA's, Inf., Post.)  [] (84869) Provided manual therapy to mobilize soft tissue/joints of cervical/CT, scapular GHJ and UE for the purpose of modulating pain, promoting relaxation,  increasing ROM, reducing/eliminating soft tissue swelling/inflammation/restriction, improving soft tissue extensibility and allowing for proper ROM for normal function with self care, reaching, carrying, lifting, house/yardwork, driving/computer work        Modalities:      Charges: 200-240  Timed Code Treatment Minutes: 40   Total Treatment Minutes: 40     [] EVAL (LOW) 46686   [] EVAL (MOD) 96116   [] EVAL (HIGH) 30816   [] RE-EVAL    [x] YG(67822) x  1 (200-215, 15 )  [] IONTO  [] NMR (32172) x     [] VASO  [] Manual (08660) x   [] Other:   [x] TA x 2 (215-240, 25')    [] Mech Traction (24348)  [] ES(attended) (74385)      [] ES (un) (80296):       HEP instruction:   Access Code: [email or web address]  URL: [email or web address]  Date: 04/15/2022  Prepared by: Kirill Washington    Exercises  Shoulder Flexion Wall Slide with Towel - 1 x daily - 7 x weekly - 10 reps - 10 hold  Sleeper Stretch - 1 x daily - 7 x weekly - 10 reps - 10 hold  Supine Shoulder External Rotation in 45 Degrees Abduction AAROM with Dowel - 1 x daily - 7 x weekly - 10 reps - 10 hold  UK Deltoid Progam - 1 x daily - 7 x weekly - 3-5 minutes  Scapular Retraction with Resistance - 1 x daily - 3-4 x weekly - 3 sets - 10 reps  Standing Bent Over Triceps Extension - 1 x daily - 3-4 x weekly - 3 sets - 10 reps  Staggered Stance Biceps Curl - 1 x daily - 3-4 x weekly - 3 sets - 10 reps  Shoulder External Rotation with Anchored Resistance - 1 x daily - 3-4 x weekly - 3 sets - 10 reps  Shoulder Internal Rotation with Resistance - 1 x daily - 3-4 x weekly - 3 sets - 10 reps  Standing Wall Ball Circles with Mini Swiss Ball - 1 x daily - 3-4 x weekly - 3 sets - 10 reps        GOALS:  Patient stated goal: return to work duties without pain    [x] Progressing: [] Met: [] Not Met: [] Adjusted    Therapist goals for Patient:   Short Term Goals: To be achieved in: 2-6 weeks  1. Independent in HEP and progression per patient tolerance, in order to prevent re-injury. [x] Progressing: [] Met: [] Not Met: [] Adjusted   2. Patient will have a decrease in pain to facilitate improvement in movement, function, and ADLs as indicated by Functional Deficits. [] Progressing: [x] Met: [] Not Met: [] Adjusted   3. Patient will demonstrate increased AROM to 120+ flex, 60+ ERto allow for proper joint functioning as indicated by patients Functional Deficits. 6 weeks   [] Progressing: [x] Met: [] Not Met: [] Adjusted  4. Patient will return to ADLs such as bathing/dressing/grooming without increased symptoms or restriction. 6 weeks  [] Progressing: [x] Met: [] Not Met: [] Adjusted    Long Term Goals: To be achieved in: 12-16+ weeks  1. Disability index score of 20% or less for the UEFI to assist with reaching prior level of function. [] Progressing: [x] Met: [] Not Met: [] Adjusted  2. Patient will demonstrate increased AROM to 150+ flex, 80+ ERto allow for proper joint functioning as indicated by patients Functional Deficits. [] Progressing: [x] Met: [] Not Met: [] Adjusted  3. Patient will demonstrate an increase in Strength to 4/5 or greater to allow for proper functional mobility as indicated by patients Functional Deficits.    [] Progressing: [x] Met: [] Not Met: [] Adjusted  4. Patient will return to reaching/lifting/carrying without increased symptoms or restriction. [x] Progressing: [] Met: [] Not Met: [] Adjusted  5. Patient will be able to lift/carry greater than 25lbs without increased symptoms or restriction in order to return to work duties. [x] Progressing: [] Met: [] Not Met: [] Adjusted    Overall Progression Towards Functional goals/ Treatment Progress Update:  [] Patient is progressing as expected towards functional goals listed. [x] Progression is slowed due to complexities/Impairments listed. [x] Progression has been slowed due to co-morbidities. [] Plan just implemented, too soon to assess goals progression <30days   [] Goals require adjustment due to lack of progress  [] Patient is not progressing as expected and requires additional follow up with physician  [] Other    Prognosis for POC: [x] Good [] Fair  [] Poor      Patient requires continued skilled intervention: [x] Yes  [] No    Treatment/Activity Tolerance:  [x] Patient able to complete treatment  [] Patient limited by fatigue  [] Patient limited by pain     [] Patient limited by other medical complications  [] Other: pt able to progress overhead strengthening and increase stability exercises. Continue to progress 1x per week.            Return to Play: (if applicable)   []  Stage 1: Intro to Strength   []  Stage 2: Return to Run and Strength   []  Stage 3: Return to Jump and Strength   []  Stage 4: Dynamic Strength and Agility   []  Stage 5: Sport Specific Training     []  Ready to Return to Play, Meets All Above Stages   []  Not Ready for Return to Sports   Comments:                               PLAN: See eval  [x] Continue per plan of care [] Alter current plan (see comments above)  [] Plan of care initiated [] Hold pending MD visit [] Discharge  Note: If patient does not return for scheduled/ recommended follow up visits, this note will serve as a discharge from care along with most recent update on progress. Reviewed insurance benefits for physical therapy in an outpatient hospital based setting with the patient, including deductible  and allowable visit number.  Pt was informed of possible out of pocket costs, as well as, informed of other service options for continuing supervised sessions without required skilled PT intervention such as the cash based [employer] program.     Electronically signed by:   Carmelita Mendez, PT, DPT, Cert DN

## 2022-05-11 ENCOUNTER — HOSPITAL ENCOUNTER (OUTPATIENT)
Dept: PHYSICAL THERAPY | Age: 47
Setting detail: THERAPIES SERIES
Discharge: HOME | End: 2022-05-11
Payer: COMMERCIAL

## 2022-05-12 ENCOUNTER — HOSPITAL ENCOUNTER (EMERGENCY)
Age: 47
Discharge: HOME | End: 2022-05-12
Payer: COMMERCIAL

## 2022-05-12 ENCOUNTER — APPOINTMENT (OUTPATIENT)
Dept: GENERAL RADIOLOGY | Age: 47
End: 2022-05-12
Payer: COMMERCIAL

## 2022-05-12 VITALS
HEART RATE: 79 BPM | SYSTOLIC BLOOD PRESSURE: 137 MMHG | TEMPERATURE: 98.8 F | OXYGEN SATURATION: 97 % | RESPIRATION RATE: 20 BRPM | DIASTOLIC BLOOD PRESSURE: 84 MMHG
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
## 2022-05-12 DIAGNOSIS — S20.211A CONTUSION OF RIGHT CHEST WALL, INITIAL ENCOUNTER: ICD-10-CM

## 2022-05-12 PROCEDURE — 71101 X-RAY EXAM UNILAT RIBS/CHEST: CPT

## 2022-05-12 PROCEDURE — 99283 EMERGENCY DEPT VISIT LOW MDM: CPT

## 2022-05-12 RX ORDER — IBUPROFEN 400 MG/1
800 TABLET ORAL ONCE
Status: DISCONTINUED | OUTPATIENT
Start: 2022-05-12 | End: 2022-05-12 | Stop reason: HOSPADM

## 2022-05-12 RX ORDER — HYDROCODONE BITARTRATE AND ACETAMINOPHEN 5; 325 MG/1; MG/1
1 TABLET ORAL ONCE
Status: DISCONTINUED | OUTPATIENT
Start: 2022-05-12 | End: 2022-05-12 | Stop reason: HOSPADM

## 2022-05-12 RX ORDER — HYDROCODONE BITARTRATE AND ACETAMINOPHEN 5; 325 MG/1; MG/1
1 TABLET ORAL EVERY 6 HOURS PRN
Qty: 10 TABLET | Refills: 0 | Status: SHIPPED | OUTPATIENT
Start: 2022-05-12 | End: 2022-05-15

## 2022-05-12 RX ORDER — IBUPROFEN 800 MG/1
800 TABLET ORAL EVERY 8 HOURS PRN
Qty: 30 TABLET | Refills: 0 | Status: SHIPPED | OUTPATIENT
Start: 2022-05-12

## 2022-05-12 ASSESSMENT — ENCOUNTER SYMPTOMS
EYE PAIN: 0
SHORTNESS OF BREATH: 0
BACK PAIN: 0
NAUSEA: 0
COUGH: 0
SORE THROAT: 0
ABDOMINAL PAIN: 0
VOMITING: 0

## 2022-05-12 ASSESSMENT — PAIN SCALES - GENERAL: PAINLEVEL_OUTOF10: 10

## 2022-05-12 ASSESSMENT — PAIN - FUNCTIONAL ASSESSMENT
PAIN_FUNCTIONAL_ASSESSMENT: 0-10
PAIN_FUNCTIONAL_ASSESSMENT: 0-10

## 2022-05-12 ASSESSMENT — PAIN DESCRIPTION - ORIENTATION: ORIENTATION: RIGHT

## 2022-05-12 ASSESSMENT — PAIN DESCRIPTION - LOCATION: LOCATION: RIB CAGE
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
## 2022-05-13 ENCOUNTER — APPOINTMENT (OUTPATIENT)
Dept: PHYSICAL THERAPY | Age: 47
End: 2022-05-13
Payer: COMMERCIAL

## 2022-05-18 ENCOUNTER — HOSPITAL ENCOUNTER (OUTPATIENT)
Dept: PHYSICAL THERAPY | Age: 47
Setting detail: THERAPIES SERIES
Discharge: HOME | End: 2022-05-18
Payer: COMMERCIAL

## 2022-05-18 NOTE — FLOWSHEET NOTE
[facility]     [facility]  Cancellation/No-show Note  Patient Name:  Keren Ingram  :  1975   Date:  2022   Cancelled visits to date: 36  No-shows to date: 0    For today's appointment patient:  [x]  Cancelled  []  Rescheduled appointment  []  No-show     Reason given by patient:  []  Patient ill  []  Conflicting appointment  []  No transportation    []  Conflict with work  []  No reason given  []  Other:     Comments:  Out of town     Electronically signed by:  Lisa Moulton, PT, PT

## 2022-05-20 ENCOUNTER — APPOINTMENT (OUTPATIENT)
Dept: PHYSICAL THERAPY | Age: 47
End: 2022-05-20
Payer: COMMERCIAL

## 2022-05-23 DIAGNOSIS — Z98.890 OTHER SPECIFIED POSTPROCEDURAL STATES: ICD-10-CM

## 2022-05-25 ENCOUNTER — HOSPITAL ENCOUNTER (OUTPATIENT)
Dept: PHYSICAL THERAPY | Age: 47
Setting detail: THERAPIES SERIES
Discharge: HOME | End: 2022-05-25
Payer: COMMERCIAL

## 2022-05-25 PROCEDURE — 97530 THERAPEUTIC ACTIVITIES: CPT

## 2022-05-25 PROCEDURE — 97110 THERAPEUTIC EXERCISES: CPT
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
## 2022-05-27 ENCOUNTER — APPOINTMENT (OUTPATIENT)
Dept: PHYSICAL THERAPY | Age: 47
End: 2022-05-27
Payer: COMMERCIAL

## 2022-06-01 RX ORDER — MELOXICAM 15 MG/1
TABLET ORAL
Qty: 30 TABLET | Refills: 2 | OUTPATIENT
Start: 2022-06-01

## 2024-09-18 NOTE — FLOWSHEET NOTE
[address] [facility]     Physical Therapy  Cancellation/No-show Note  Patient Name:  Concepcion Hogue  :  1975   Date:  2022   Cancelled visits to date: 03  No-shows to date: 0    For today's appointment patient:  [x]  Cancelled  []  Rescheduled appointment  []  No-show     Reason given by patient:  []  Patient ill  [x]  Conflicting appointment  []  No transportation    []  Conflict with work  []  No reason given  []  Other:     Comments: .      Electronically signed by:  Jennifer Deutsch PT, PT What Type Of Note Output Would You Prefer (Optional)?: Standard Output Hpi Title: Evaluation of Skin Lesions How Severe Are Your Spot(S)?: moderate Have Your Spot(S) Been Treated In The Past?: has not been treated

## (undated) DEVICE — SPONGE GZ W4XL8IN COT WVN 12 PLY

## (undated) DEVICE — SOLUTION IV IRRIG LACTATED RINGERS 3000ML 2B7487

## (undated) DEVICE — PROBE ABLAT XL 90DEG ASPIR BPLR RF 1 PC ELECTRD ERGO HNDL

## (undated) DEVICE — BUR SHAVER 5 MMX13 CM 8 FLUT OVL FOR AGGRESSIVE BNE COOLCUT

## (undated) DEVICE — TUBING FLD MGMT Y DBL SPIK DUALWAVE

## (undated) DEVICE — SUTURE FIBERWIRE SZ 2 W/ TAPERED NEEDLE BLUE L38IN NONABSORB BLU L26.5MM 1/2 CIRCLE AR7200

## (undated) DEVICE — 3M™ IOBAN™ 2 ANTIMICROBIAL INCISE DRAPE 6640EZ: Brand: IOBAN™ 2

## (undated) DEVICE — GOWN,SIRUS,POLYRNF,BRTHSLV,XL,30/CS: Brand: MEDLINE

## (undated) DEVICE — TUBING PMP L6FT CONT WAVE EXTN

## (undated) DEVICE — SYSTEM SKIN CLSR 22CM DERMBND PRINEO

## (undated) DEVICE — PUDDLEVAC FLOOR SUCTION DEVICE: Brand: PUDDLEVAC

## (undated) DEVICE — GOWN,REINFRCE,POLY,ECLIPSE,SLV,3XLG: Brand: MEDLINE

## (undated) DEVICE — SHOULDER ARTHROSCOPY: Brand: MEDLINE INDUSTRIES, INC.

## (undated) DEVICE — SUTURE VCRL SZ 3-0 L27IN ABSRB UD L26MM CT-2 1/2 CIR J232H

## (undated) DEVICE — PAD DRY FLOOR ABS 32X58IN GRN

## (undated) DEVICE — KIT INSTR W/ 2.4MM GUIDEPIN SUT PASS WIRE NO2 FIBERWIRE

## (undated) DEVICE — CANNULA ARTHSCP L4CM DIA8MM PASSPRT BTTN

## (undated) DEVICE — SUTURE PDS II SZ 1 L27IN ABSRB VLT CT-1 L36MM 1/2 CIR Z341H

## (undated) DEVICE — GLOVE SURG SZ 8 L12IN FNGR THK75MIL WHT LTX POLYMER BEAD

## (undated) DEVICE — NEEDLE SUT PASS FOR ARTHSCP SCORPION

## (undated) DEVICE — SUTURE MCRYL SZ 4-0 L27IN ABSRB UD L19MM PS-2 1/2 CIR PRIM Y426H

## (undated) DEVICE — BLADE SHV L13CM DIA5MM EXCALIBUR AGG COOLCUT

## (undated) DEVICE — CANNULA ARTHSCP L7CM DIA7MM TRNSLUC THRD FLX W/ NO SQUIRT

## (undated) DEVICE — UNDERGLOVE SURG SZ 8 BLU LTX FREE SYN POLYISOPRENE POLYMER

## (undated) DEVICE — TUBING PMP L16FT MAIN DISP FOR AR-6400 AR-6475

## (undated) DEVICE — TOWEL,STOP FLAG GOLD N-W: Brand: MEDLINE